# Patient Record
Sex: FEMALE | Race: WHITE
[De-identification: names, ages, dates, MRNs, and addresses within clinical notes are randomized per-mention and may not be internally consistent; named-entity substitution may affect disease eponyms.]

---

## 2017-03-26 ENCOUNTER — HOSPITAL ENCOUNTER (EMERGENCY)
Dept: HOSPITAL 62 - ER | Age: 47
Discharge: HOME | End: 2017-03-26
Payer: SELF-PAY

## 2017-03-26 VITALS — SYSTOLIC BLOOD PRESSURE: 176 MMHG | DIASTOLIC BLOOD PRESSURE: 92 MMHG

## 2017-03-26 DIAGNOSIS — R51: ICD-10-CM

## 2017-03-26 DIAGNOSIS — Z98.51: ICD-10-CM

## 2017-03-26 DIAGNOSIS — Z90.49: ICD-10-CM

## 2017-03-26 DIAGNOSIS — R30.0: ICD-10-CM

## 2017-03-26 DIAGNOSIS — R10.2: Primary | ICD-10-CM

## 2017-03-26 DIAGNOSIS — N83.9: ICD-10-CM

## 2017-03-26 DIAGNOSIS — R19.7: ICD-10-CM

## 2017-03-26 DIAGNOSIS — I10: ICD-10-CM

## 2017-03-26 DIAGNOSIS — J44.9: ICD-10-CM

## 2017-03-26 DIAGNOSIS — F17.200: ICD-10-CM

## 2017-03-26 DIAGNOSIS — Z87.442: ICD-10-CM

## 2017-03-26 DIAGNOSIS — R11.2: ICD-10-CM

## 2017-03-26 LAB
ALBUMIN SERPL-MCNC: 3.9 G/DL (ref 3.5–5)
ALP SERPL-CCNC: 75 U/L (ref 38–126)
ALT SERPL-CCNC: 22 U/L (ref 9–52)
ANION GAP SERPL CALC-SCNC: 11 MMOL/L (ref 5–19)
APPEARANCE UR: (no result)
AST SERPL-CCNC: 16 U/L (ref 14–36)
BASOPHILS # BLD AUTO: 0.1 10^3/UL (ref 0–0.2)
BASOPHILS NFR BLD AUTO: 0.8 % (ref 0–2)
BILIRUB DIRECT SERPL-MCNC: 0.2 MG/DL (ref 0–0.4)
BILIRUB SERPL-MCNC: 0.3 MG/DL (ref 0.2–1.3)
BILIRUB UR QL STRIP: NEGATIVE
BUN SERPL-MCNC: 11 MG/DL (ref 7–20)
CALCIUM: 8.9 MG/DL (ref 8.4–10.2)
CHLAM PCR: NOT DETECTED
CHLORIDE SERPL-SCNC: 108 MMOL/L (ref 98–107)
CO2 SERPL-SCNC: 24 MMOL/L (ref 22–30)
CREAT SERPL-MCNC: 0.59 MG/DL (ref 0.52–1.25)
EOSINOPHIL # BLD AUTO: 0.1 10^3/UL (ref 0–0.6)
EOSINOPHIL NFR BLD AUTO: 0.6 % (ref 0–6)
ERYTHROCYTE [DISTWIDTH] IN BLOOD BY AUTOMATED COUNT: 17.3 % (ref 11.5–14)
GLUCOSE SERPL-MCNC: 80 MG/DL (ref 75–110)
GLUCOSE UR STRIP-MCNC: NEGATIVE MG/DL
HCT VFR BLD CALC: 34.6 % (ref 36–47)
HGB BLD-MCNC: 11.5 G/DL (ref 12–15.5)
HGB HCT DIFFERENCE: -0.1
KETONES UR STRIP-MCNC: NEGATIVE MG/DL
LIPASE SERPL-CCNC: 132.6 U/L (ref 23–300)
LYMPHOCYTES # BLD AUTO: 2 10^3/UL (ref 0.5–4.7)
LYMPHOCYTES NFR BLD AUTO: 23.3 % (ref 13–45)
MCH RBC QN AUTO: 25.6 PG (ref 27–33.4)
MCHC RBC AUTO-ENTMCNC: 33.2 G/DL (ref 32–36)
MCV RBC AUTO: 77 FL (ref 80–97)
MONOCYTES # BLD AUTO: 0.7 10^3/UL (ref 0.1–1.4)
MONOCYTES NFR BLD AUTO: 8.5 % (ref 3–13)
NEUTROPHILS # BLD AUTO: 5.6 10^3/UL (ref 1.7–8.2)
NEUTS SEG NFR BLD AUTO: 66.8 % (ref 42–78)
NITRITE UR QL STRIP: NEGATIVE
PH UR STRIP: 7 [PH] (ref 5–9)
POTASSIUM SERPL-SCNC: 4.1 MMOL/L (ref 3.6–5)
PROT SERPL-MCNC: 6.6 G/DL (ref 6.3–8.2)
PROT UR STRIP-MCNC: NEGATIVE MG/DL
RBC # BLD AUTO: 4.49 10^6/UL (ref 3.72–5.28)
SODIUM SERPL-SCNC: 142.7 MMOL/L (ref 137–145)
SP GR UR STRIP: 1.01
UROBILINOGEN UR-MCNC: NEGATIVE MG/DL (ref ?–2)
WBC # BLD AUTO: 8.5 10^3/UL (ref 4–10.5)

## 2017-03-26 PROCEDURE — 93976 VASCULAR STUDY: CPT

## 2017-03-26 PROCEDURE — 87210 SMEAR WET MOUNT SALINE/INK: CPT

## 2017-03-26 PROCEDURE — 87591 N.GONORRHOEAE DNA AMP PROB: CPT

## 2017-03-26 PROCEDURE — 84703 CHORIONIC GONADOTROPIN ASSAY: CPT

## 2017-03-26 PROCEDURE — 36415 COLL VENOUS BLD VENIPUNCTURE: CPT

## 2017-03-26 PROCEDURE — 87491 CHLMYD TRACH DNA AMP PROBE: CPT

## 2017-03-26 PROCEDURE — 85025 COMPLETE CBC W/AUTO DIFF WBC: CPT

## 2017-03-26 PROCEDURE — 76830 TRANSVAGINAL US NON-OB: CPT

## 2017-03-26 PROCEDURE — 80053 COMPREHEN METABOLIC PANEL: CPT

## 2017-03-26 PROCEDURE — 96374 THER/PROPH/DIAG INJ IV PUSH: CPT

## 2017-03-26 PROCEDURE — 81001 URINALYSIS AUTO W/SCOPE: CPT

## 2017-03-26 PROCEDURE — 83690 ASSAY OF LIPASE: CPT

## 2017-03-26 PROCEDURE — 99284 EMERGENCY DEPT VISIT MOD MDM: CPT

## 2017-03-26 NOTE — ER DOCUMENT REPORT
ED Medical Screen (RME)





- General


Stated Complaint: ABDOMINAL PAIN


Mode of Arrival: Ambulatory


Information source: Patient


Notes: 


Patient presents to the emergency department via EMS for right side pain right 

upper quadrant pain reports pain with void for the last few days.  Also reports 

migraine nausea.  Reports recent treatment for UTI.  Reports history of kidney 

stones and gallbladder issues.














I have greeted and performed a rapid initial assessment of this patient.  A 

comprehensive ED assessment and evaluation of the patient, analysis of test 

results and completion of the medical decision making process will be conducted 

by additional ED providers.


TRAVEL OUTSIDE OF THE [place] IN LAST 30 DAYS: No





- Related Data


Allergies/Adverse Reactions: 


 





No Known Allergies Allergy (Verified 03/26/17 10:33)


 











Past Medical History





- Past Medical History


Cardiac Medical History: Reports: Hx Hypertension - Off lisinopril for months.  

Says she can't afford it despite it being $4


Pulmonary Medical History: Reports: Hx COPD


Renal/ Medical History: Reports: Hx Kidney Stones


Musculoskeltal Medical History: Reports Hx Musculoskeletal Trauma


Psychiatric Medical History: Reports: Hx Anxiety, Hx Depression


Traumatic Medical History: Reports: Hx Fractures - Wrist


Past Surgical History: Reports: Hx Appendectomy, Hx Tubal Ligation





- Immunizations


Hx Diphtheria, Pertussis, Tetanus Vaccination: No

## 2017-03-26 NOTE — ER DOCUMENT REPORT
ED GI/





- General


Chief Complaint: Abdominal Pain


Stated Complaint: ABDOMINAL PAIN


Mode of Arrival: Ambulatory


Information source: Patient


Notes: 


Patient presents complaining of right lower pelvic pain that radiates around to 

right flank area for the past 2-3 days.  Patient does report some burning with 

urination.  Patient does complain some nausea vomiting and diarrhea.  Patient 

states she's vomited once today and had diarrhea 3 episodes.  Patient denies 

any fever.  Patient does report mild headache pain.  Patient ports previous 

history kidney stones and is concerned about that today.


TRAVEL OUTSIDE OF THE [place] IN LAST 30 DAYS: No





- HPI


Patient complains to provider of: Abdominal pain, Dysuria, Flank pain


Onset: Other - 3 days


Timing/Duration: Gradual


Quality of pain: Achy


Pain Level: 3


Context: denies: Pregnant


Location: RLQ, Right flank


Vaginal bleeding (Compared to normal period): None


Sexual history: Active


Associated symptoms: Diarrhea, Dysuria, Nausea, Vomiting.  denies: Fever, Loss 

of appetite, Urinary hesitancy, Urinary frequency, Urinary retention, Urinary 

urgency, Vaginal discharge


Exacerbated by: Denies


Relieved by: Denies


Similar symptoms previously: Yes


Recently seen / treated by doctor: No





- Related Data


Allergies/Adverse Reactions: 


 





No Known Allergies Allergy (Verified 03/26/17 10:33)


 











Past Medical History





- General


Information source: Patient


Last Menstrual Period: 03/19/2017





- Social History


Smoking Status: Current Every Day Smoker


Chew tobacco use (# tins/day): No


Frequency of alcohol use: None


Drug Abuse: None


Occupation: none


Lives with: Spouse/Significant other


Family History: CAD, CVA, DM, Hyperlipidemia, Hypertension, Malignancy


Patient has suicidal ideation: No


Patient has homicidal ideation: No





- Past Medical History


Cardiac Medical History: Reports: Hx Hypertension - Off lisinopril for months.  

Says she can't afford it despite it being $4


Pulmonary Medical History: Reports: Hx COPD


Renal/ Medical History: Reports: Hx Kidney Stones.  Denies: Hx Peritoneal 

Dialysis


Musculoskeltal Medical History: Reports Hx Musculoskeletal Trauma


Psychiatric Medical History: Reports: Hx Anxiety, Hx Depression


Traumatic Medical History: Reports: Hx Fractures - Wrist


Past Surgical History: Reports: Hx Appendectomy, Hx Tubal Ligation





- Immunizations


Hx Diphtheria, Pertussis, Tetanus Vaccination: No





Review of Systems





- Review of Systems


Constitutional: No symptoms reported.  denies: Fever, Recent illness


EENT: No symptoms reported


Cardiovascular: No symptoms reported.  denies: Chest pain


Respiratory: No symptoms reported.  denies: Cough, Short of breath


Gastrointestinal: Abdominal pain - Right lower quadrant, Diarrhea, Nausea, 

Vomiting.  denies: Poor appetite


Genitourinary: Dysuria, Flank pain


Female Genitourinary: No symptoms reported.  denies: Pregnant, Vaginal discharge

, Vaginal bleeding


Musculoskeletal: Back pain - Right flank


Skin: No symptoms reported


Hematologic/Lymphatic: No symptoms reported


Neurological/Psychological: No symptoms reported





Physical Exam





- Vital signs


Vitals: 


 











Temp Pulse Resp BP Pulse Ox


 


 98.4 F   77   20   182/91 H  100 


 


 03/26/17 10:38  03/26/17 10:38  03/26/17 10:38  03/26/17 10:38  03/26/17 10:38














- General


General appearance: Appears well, Alert


In distress: None





- HEENT


Head: Normocephalic, Atraumatic


Eyes: Normal


Nasal: Normal


Mouth/Lips: Normal


Mucous membranes: Normal


Neck: Normal, Supple.  No: Lymphadenopathy





- Respiratory


Respiratory status: No respiratory distress


Chest status: Nontender


Breath sounds: Normal.  No: Rales, Rhonchi, Stridor, Wheezing


Chest palpation: Normal





- Cardiovascular


Rhythm: Regular


Heart sounds: S1 appreciated, S2 appreciated


Murmur: No





- Abdominal


Inspection: Normal


Distension: No distension


Bowel sounds: Normal


Tenderness: Tender - Right lateral side of abdomen.  No: McBurney's point, 

Lundy's sign


Organomegaly: No organomegaly





- Genitourinary


External exam: Normal


Speculum exam: Cervix closed


Vaginal bleeding: None


Bimanuel exam: Adnexal mass - Right side, Adnexal tenderness - Bilateral





- Back


Back: CVA tenderness - Right





- Extremities


General upper extremity: Normal inspection, Normal strength


General lower extremity: Normal inspection, Normal strength





- Neurological


Neuro grossly intact: Yes


Cognition: Normal


Egan Coma Scale Eye Opening: Spontaneous


Egan Coma Scale Verbal: Oriented


Egan Coma Scale Motor: Obeys Commands


Diego Coma Scale Total: 15





- Psychological


Associated symptoms: Normal affect, Normal mood





- Skin


Skin Temperature: Warm


Skin Moisture: Dry


Skin Color: Normal





Course





- Re-evaluation


Re-evalutation: 


03/26/17 13:12


Consulted with Dr. Hargrove regarding patient presentation, agrees with plan for 

ultrasound imaging





03/26/17 15:55


Consulted with GYN Dr. Coley who recommends outpatient follow-up in the 

office next week.  Recommends having patient call the office for follow-up.  

Also recommends to follow patient up for repeat ultrasound in about a month's 

time as an outpatient.  No additional testing recommended at this time.





- Vital Signs


Vital signs: 


 











Temp Pulse Resp BP Pulse Ox


 


 98.5 F   73   18   176/92 H  98 


 


 03/26/17 16:25  03/26/17 16:25  03/26/17 16:25  03/26/17 16:25  03/26/17 16:25














- Laboratory


Result Diagrams: 


 03/26/17 12:17





 03/26/17 12:17


Laboratory results interpreted by me: 


 











  03/26/17 03/26/17 03/26/17





  11:02 12:17 12:17


 


Hgb   11.5 L 


 


Hct   34.6 L 


 


MCV   77 L 


 


MCH   25.6 L 


 


RDW   17.3 H 


 


Chloride    108 H


 


Urine Blood  SMALL H  


 


Ur Leukocyte Esterase  SMALL H  








03/26/17 15:56





 Labs- Entire Visit











  03/26/17 03/26/17 03/26/17





  11:02 12:17 12:17


 


WBC   8.5 


 


RBC   4.49 


 


Hgb   11.5 L 


 


Hct   34.6 L 


 


MCV   77 L 


 


MCH   25.6 L 


 


MCHC   33.2 


 


RDW   17.3 H 


 


Plt Count   224 


 


Seg Neutrophils %   66.8 


 


Lymphocytes %   23.3 


 


Monocytes %   8.5 


 


Eosinophils %   0.6 


 


Basophils %   0.8 


 


Absolute Neutrophils   5.6 


 


Absolute Lymphocytes   2.0 


 


Absolute Monocytes   0.7 


 


Absolute Eosinophils   0.1 


 


Absolute Basophils   0.1 


 


Sodium    142.7


 


Potassium    4.1


 


Chloride    108 H


 


Carbon Dioxide    24


 


Anion Gap    11


 


BUN    11


 


Creatinine    0.59


 


Est GFR ( Amer)    > 60


 


Est GFR (Non-Af Amer)    > 60


 


Glucose    80


 


Calcium    8.9


 


Total Bilirubin    0.3


 


Direct Bilirubin    0.2


 


Indirect Bilirubin    Not Reportable


 


Neonat Total Bilirubin    Not Reportable


 


AST    16


 


ALT    22


 


Alkaline Phosphatase    75


 


Total Protein    6.6


 


Albumin    3.9


 


Lipase    132.6


 


Serum HCG, Qual   


 


Urine Color  YELLOW  


 


Urine Appearance  SLIGHTLY-CLOUDY  


 


Urine pH  7.0  


 


Ur Specific Gravity  1.008  


 


Urine Protein  NEGATIVE  


 


Urine Glucose (UA)  NEGATIVE  


 


Urine Ketones  NEGATIVE  


 


Urine Blood  SMALL H  


 


Urine Nitrite  NEGATIVE  


 


Urine Bilirubin  NEGATIVE  


 


Urine Urobilinogen  NEGATIVE  


 


Ur Leukocyte Esterase  SMALL H  


 


Urine WBC (Auto)  6  


 


Urine RBC (Auto)  2  


 


Squamous Epi Cells Auto  7  


 


Urine Ascorbic Acid  NEGATIVE  


 


Trichomonas (Wet Prep)   


 


Vaginal WBC   


 


Vaginal RBC   


 


Vaginal Yeast   


 


Chlamydia DNA (PCR)   


 


N.gonorrhoeae DNA (PCR)   














  03/26/17 03/26/17 03/26/17





  12:17 13:56 13:56


 


WBC   


 


RBC   


 


Hgb   


 


Hct   


 


MCV   


 


MCH   


 


MCHC   


 


RDW   


 


Plt Count   


 


Seg Neutrophils %   


 


Lymphocytes %   


 


Monocytes %   


 


Eosinophils %   


 


Basophils %   


 


Absolute Neutrophils   


 


Absolute Lymphocytes   


 


Absolute Monocytes   


 


Absolute Eosinophils   


 


Absolute Basophils   


 


Sodium   


 


Potassium   


 


Chloride   


 


Carbon Dioxide   


 


Anion Gap   


 


BUN   


 


Creatinine   


 


Est GFR ( Amer)   


 


Est GFR (Non-Af Amer)   


 


Glucose   


 


Calcium   


 


Total Bilirubin   


 


Direct Bilirubin   


 


Indirect Bilirubin   


 


Neonat Total Bilirubin   


 


AST   


 


ALT   


 


Alkaline Phosphatase   


 


Total Protein   


 


Albumin   


 


Lipase   


 


Serum HCG, Qual  NEGATIVE  


 


Urine Color   


 


Urine Appearance   


 


Urine pH   


 


Ur Specific Gravity   


 


Urine Protein   


 


Urine Glucose (UA)   


 


Urine Ketones   


 


Urine Blood   


 


Urine Nitrite   


 


Urine Bilirubin   


 


Urine Urobilinogen   


 


Ur Leukocyte Esterase   


 


Urine WBC (Auto)   


 


Urine RBC (Auto)   


 


Squamous Epi Cells Auto   


 


Urine Ascorbic Acid   


 


Trichomonas (Wet Prep)    NO TRICHOMONAS SEEN


 


Vaginal WBC    FEW WBCS SEEN


 


Vaginal RBC    RARE RBCS SEEN


 


Vaginal Yeast    NO YEAST SEEN


 


Chlamydia DNA (PCR)   NOT DETECTED 


 


N.gonorrhoeae DNA (PCR)   NOT DETECTED 














03/26/17 16:06








03/26/17 18:36








- Diagnostic Test


Radiology reviewed: Reports reviewed





Discharge





- Discharge


Clinical Impression: 


 Flank pain, Urinary symptom or sign, Ovarian mass, right, Vomiting and diarrhea





Hypertension


Qualifiers:


 Hypertension type: essential hypertension Qualified Code(s): I10 - Essential (

primary) hypertension





Condition: Stable


Disposition: HOME, SELF-CARE


Instructions:  Abdominal Pain ([facility]), Oral Narcotic Medication ([facility]), Antinausea 

Medication ([facility]), High Blood Pressure, Requiring Treatment ([facility]), Growth or Mass

, Pending Workup ([facility]), Nausea or Vomiting, Nonspecific ([facility]), Diarrhea, 

Nonspecific ([facility]), Urinary Tract Infection ([facility])


Additional Instructions: 


Return immediately for any new or worsening symptoms





Followup with your primary care provider, call tomorrow to make a followup 

appointment.  A primary doctor can refill your blood pressure medications for 

you.





Your ultrasound showed a mass within your right ovary.  Follow-up with Dr. Coley next week.  Call the office on Monday for an appointment.  Left the 

office staff know that you were seen in the emergency department and that she 

would like to see you next week for further evaluation.  She would also like 

you to have a repeat ultrasound in a month.





Follow up with a urologist for further evaluation








Prescriptions: 


Cephalexin Monohydrate [Keflex 500 mg Capsule] 500 mg PO BID 7 Days


Hydrocodone/Acetaminophen [Norco 5-325 Tablet] 1 each PO Q4 PRN #15 tablet


 PRN Reason: 


Lisinopril 10 mg PO DAILY #7 tablet


Forms:  Elevated Blood Pressure


Referrals: 


BARBI SORIA MD [ACTIVE STAFF] - Follow up in 3-5 days


[facility] [Provider Group] - Follow up tomorrow

## 2017-04-17 ENCOUNTER — HOSPITAL ENCOUNTER (EMERGENCY)
Dept: HOSPITAL 62 - ER | Age: 47
Discharge: HOME | End: 2017-04-17
Payer: SELF-PAY

## 2017-04-17 VITALS — SYSTOLIC BLOOD PRESSURE: 156 MMHG | DIASTOLIC BLOOD PRESSURE: 90 MMHG

## 2017-04-17 DIAGNOSIS — J44.9: ICD-10-CM

## 2017-04-17 DIAGNOSIS — F17.210: ICD-10-CM

## 2017-04-17 DIAGNOSIS — Z87.442: ICD-10-CM

## 2017-04-17 DIAGNOSIS — Z98.51: ICD-10-CM

## 2017-04-17 DIAGNOSIS — R93.8: ICD-10-CM

## 2017-04-17 DIAGNOSIS — R10.31: Primary | ICD-10-CM

## 2017-04-17 DIAGNOSIS — R03.0: ICD-10-CM

## 2017-04-17 LAB
ALBUMIN SERPL-MCNC: 4.3 G/DL (ref 3.5–5)
ALP SERPL-CCNC: 83 U/L (ref 38–126)
ALT SERPL-CCNC: 25 U/L (ref 9–52)
ANION GAP SERPL CALC-SCNC: 10 MMOL/L (ref 5–19)
APPEARANCE UR: (no result)
AST SERPL-CCNC: 18 U/L (ref 14–36)
BASOPHILS # BLD AUTO: 0.1 10^3/UL (ref 0–0.2)
BASOPHILS NFR BLD AUTO: 1 % (ref 0–2)
BILIRUB DIRECT SERPL-MCNC: 0.1 MG/DL (ref 0–0.4)
BILIRUB SERPL-MCNC: 0.2 MG/DL (ref 0.2–1.3)
BILIRUB UR QL STRIP: NEGATIVE
BUN SERPL-MCNC: 14 MG/DL (ref 7–20)
CALCIUM: 9.5 MG/DL (ref 8.4–10.2)
CHLORIDE SERPL-SCNC: 106 MMOL/L (ref 98–107)
CO2 SERPL-SCNC: 25 MMOL/L (ref 22–30)
CREAT SERPL-MCNC: 0.64 MG/DL (ref 0.52–1.25)
EOSINOPHIL # BLD AUTO: 0.2 10^3/UL (ref 0–0.6)
EOSINOPHIL NFR BLD AUTO: 2 % (ref 0–6)
ERYTHROCYTE [DISTWIDTH] IN BLOOD BY AUTOMATED COUNT: 18.4 % (ref 11.5–14)
GLUCOSE SERPL-MCNC: 91 MG/DL (ref 75–110)
GLUCOSE UR STRIP-MCNC: NEGATIVE MG/DL
HCT VFR BLD CALC: 36.3 % (ref 36–47)
HGB BLD-MCNC: 12 G/DL (ref 12–15.5)
HGB HCT DIFFERENCE: -0.3
KETONES UR STRIP-MCNC: NEGATIVE MG/DL
LYMPHOCYTES # BLD AUTO: 3.1 10^3/UL (ref 0.5–4.7)
LYMPHOCYTES NFR BLD AUTO: 33.8 % (ref 13–45)
MCH RBC QN AUTO: 26 PG (ref 27–33.4)
MCHC RBC AUTO-ENTMCNC: 33 G/DL (ref 32–36)
MCV RBC AUTO: 79 FL (ref 80–97)
MONOCYTES # BLD AUTO: 0.9 10^3/UL (ref 0.1–1.4)
MONOCYTES NFR BLD AUTO: 9.6 % (ref 3–13)
NEUTROPHILS # BLD AUTO: 4.9 10^3/UL (ref 1.7–8.2)
NEUTS SEG NFR BLD AUTO: 53.6 % (ref 42–78)
NITRITE UR QL STRIP: NEGATIVE
PH UR STRIP: 5 [PH] (ref 5–9)
POTASSIUM SERPL-SCNC: 4.3 MMOL/L (ref 3.6–5)
PROT SERPL-MCNC: 7 G/DL (ref 6.3–8.2)
PROT UR STRIP-MCNC: NEGATIVE MG/DL
RBC # BLD AUTO: 4.61 10^6/UL (ref 3.72–5.28)
SODIUM SERPL-SCNC: 141.2 MMOL/L (ref 137–145)
SP GR UR STRIP: 1.03
UROBILINOGEN UR-MCNC: NEGATIVE MG/DL (ref ?–2)
WBC # BLD AUTO: 9.1 10^3/UL (ref 4–10.5)

## 2017-04-17 PROCEDURE — 80053 COMPREHEN METABOLIC PANEL: CPT

## 2017-04-17 PROCEDURE — 85025 COMPLETE CBC W/AUTO DIFF WBC: CPT

## 2017-04-17 PROCEDURE — 36415 COLL VENOUS BLD VENIPUNCTURE: CPT

## 2017-04-17 PROCEDURE — 99284 EMERGENCY DEPT VISIT MOD MDM: CPT

## 2017-04-17 PROCEDURE — S0119 ONDANSETRON 4 MG: HCPCS

## 2017-04-17 PROCEDURE — 81001 URINALYSIS AUTO W/SCOPE: CPT

## 2017-04-17 PROCEDURE — 76830 TRANSVAGINAL US NON-OB: CPT

## 2017-04-17 PROCEDURE — 84703 CHORIONIC GONADOTROPIN ASSAY: CPT

## 2017-04-17 NOTE — ER DOCUMENT REPORT
ED General





- General


Chief Complaint: Abdominal Pain


Stated Complaint: ABDOMINAL PAIN


Mode of Arrival: Medic


Information source: Patient


Notes: 


Patient presents to the emergency department with complaints of right lower 

quadrant abdominal pain for close to a month. She reports the area feels 

swollen and pain radiates to her back.  Patient was evaluated and treated for 

same symptoms on March 26.  Patient was supposed to follow-up with OB/GYN for 

ovarian mass but reports her car broke down and she was unable to make the 

appointment.  Patient reports increasing pain.  She reports she did have some 

vomiting diarrhea couple days ago and none today.  Denies fever.  Reports she 

has a throbbing headache.  She also reports her blood pressures been very high.

  Patient also reports she's had frequent menses.  She reports she has her 

menses every couple weeks and it lasts for a week and is heavy.  She denies 

pain with void, denies vaginal discharge.


TRAVEL OUTSIDE OF THE [place] IN LAST 30 DAYS: No





- HPI


Onset: Other - over 23 days


Onset/Duration: Persistent


Quality of pain: Achy


Severity: Severe


Pain Level: 5


Associated symptoms: None


Exacerbated by: Denies


Relieved by: Denies


Similar symptoms previously: Yes


Recently seen / treated by doctor: Yes





- Related Data


Allergies/Adverse Reactions: 


 





No Known Allergies Allergy (Verified 03/26/17 10:33)


 











Past Medical History





- General


Information source: Patient


Last Menstrual Period: one week ago





- Social History


Smoking Status: Current Every Day Smoker


Cigarette use (# per day): Yes


Chew tobacco use (# tins/day): No


Frequency of alcohol use: None


Drug Abuse: None


Family History: CAD, CVA, DM, Hyperlipidemia, Hypertension, Malignancy


Patient has suicidal ideation: No


Patient has homicidal ideation: No





- Past Medical History


Cardiac Medical History: Reports: Hx Hypertension - Off lisinopril for months.  

Says she can't afford it despite it being $4


Pulmonary Medical History: Reports: Hx COPD


Renal/ Medical History: Reports: Hx Kidney Stones.  Denies: Hx Peritoneal 

Dialysis


Musculoskeltal Medical History: Reports Hx Musculoskeletal Trauma


Psychiatric Medical History: Reports: Hx Anxiety, Hx Depression


Traumatic Medical History: Reports: Hx Fractures - Wrist


Past Surgical History: Reports: Hx Appendectomy, Hx Tubal Ligation





- Immunizations


Hx Diphtheria, Pertussis, Tetanus Vaccination: No





Review of Systems





- Review of Systems


Notes: 


Review HPI for review of systems., All other systems negative





Physical Exam





- Vital signs


Vitals: 


 











Temp Pulse Resp BP Pulse Ox


 


 98.2 F   79   16   195/100 H  99 


 


 04/17/17 15:25  04/17/17 15:25  04/17/17 15:25  04/17/17 15:25  04/17/17 15:25














- Notes


Notes: 


PHYSICAL EXAMINATION: 


GENERAL: Well-appearing and in no acute distress nontoxic looking, patient is 

laying on the stretcher watching her iPad.


HEAD: Atraumatic, normocephalic. 


EYES: Pupils equal round and reactive to light, extraocular movements intact, 

sclera anicteric, conjunctiva are normal. 


ENT: nares patent,   Moist mucous membranes. 


NECK: Normal range of motion, supple without lymphadenopathy 


LUNGS: CTAB and equal. No wheezes rales or rhonchi. 


HEART: Regular rate and rhythm without murmurs 


ABDOMEN: Soft,RLQ tenderness. No guarding, no rebound 


BACK: Reports right side/flank pain


EXTREMITIES: Normal range of motion, no pitting edema. No cyanosis. 


NEUROLOGICAL: Cranial nerves grossly intact. Normal sensory/motor exams. 


PSYCH: Normal mood, normal affect. 


SKIN: Warm, Dry, normal turgor, no rashes or lesions noted








Course





- Re-evaluation


Re-evalutation: 


04/17/17 19:56


pt instructed on pending labs, repeat us.





04/17/17 21:55


Dr. Haile was contacted regarding patient's ultrasound showing endometrial 

thickening with a polypoid appearance.  She reports patient does need a biopsy.

  She reports patient is to call the office tomorrow and inform the office that 

she needs to be seen this week.  Patient was informed of the ultrasound and the 

need for biopsy.  Patient was informed of possible cancer.  Patient verbalized 

understanding to instructions to call.  Patient was also instructed on high 

blood pressure.  She was instructed on Percocet for the pain.  She verbalized 

understanding to all instructions.





- Vital Signs


Vital signs: 


 











Temp Pulse Resp BP Pulse Ox


 


 98.2 F   79   16   195/100 H  99 


 


 04/17/17 15:25  04/17/17 15:25  04/17/17 15:25  04/17/17 15:25  04/17/17 15:25














- Laboratory


Result Diagrams: 


 04/17/17 19:46





 04/17/17 19:46


Laboratory results interpreted by me: 


 











  04/17/17 04/17/17





  16:25 19:46


 


MCV   79 L


 


MCH   26.0 L


 


RDW   18.4 H


 


Ur Leukocyte Esterase  MODERATE H 














- Diagnostic Test


Radiology reviewed: Image reviewed, Reports reviewed - Diagnostic report text  

  EXAM DESCRIPTION: U/S NON-OB PELVIS TV W/O DOP   COMPLETED DATE/TIME: 4/17/ 2017 9:00 pm   REASON FOR STUDY: ovarian mass, re-eval   COMPARISON: 3/26/2017 

  TECHNIQUE: Dynamic and static grayscale images acquired of the pelvis via 

transvaginal approach  and recorded on PACS. Additional selected color Doppler 

and spectral images recorded.   LIMITATIONS: None.   FINDINGS: UTERUS: Contour 

normal. No mass.  ENDOMETRIAL STRIPE: There is central focal thickening the 

polypoid appearance measuring 13 x 6 by  10 mm with internal color flow, 

possible polyp.  CERVIX: No nabothian cysts.  RIGHT OVARY: Similar appearance 

of the 2 cm heterogeneous nodule with internal and peripheral  color flow.  

RIGHT OVARY DOPPLER: Normal arterial vascular flow without evidence for 

torsion.  LEFT OVARY: 17 mm luteal cyst.  LEFT OVARY DOPPLER: Normal arterial 

vascular flow without evidence for torsion.  FREE FLUID: Small amount of free 

fluid in the cul-de-sac.  OTHER: No other significant finding.  MEASUREMENTS:  

UTERUS: 8.1 x 5.0 x 4.0 cm  ENDOMETRIAL STRIPE: 13 mm  RIGHT OVARY: 2.6 x 2.5 x 

1.3 cm  LEFT OVARY: 2.7 x 1.6 x 1.6 cm   TECHNICAL DOCUMENTATION: JOB ID: 

[id number]   [employer]- All Rights Reserved     ORDER #: 

[id number] US/U/S NON-OB PELVIS TV W/O DOP  IMPRESSION: There is central focal 

endometrial thickening with a polypoid appearance measuring 13  x 6 by 10 mm 

with internal color flow, possible polyp.Similar appearance of the right 

ovarian 2 cm  heterogeneous nodule with internal and peripheral color flow. 

Outpatient Gyn follow-up is  recommended





Discharge





- Discharge


Clinical Impression: 


 Thickened endometrium, Elevated blood pressure reading





Abdominal pain


Qualifiers:


 Abdominal location: right lower quadrant Qualified Code(s): R10.31 - Right 

lower quadrant pain





Condition: Stable


Instructions:  Oral Narcotic Medication ([facility]), Abdominal Pain ([facility])


Additional Instructions: 


*You have been evaluated for abdominal pain, endometrial thickening


*Take medication as prescribed


*Follow up with OB/GYN tomorrow. Call [facility], inform them that Dr Haile 

has been contacted by the Emergency Department and 


has advised that you need an appointment this week


*Return to ED for worsening condition, changes, needs








Monitor your blood pressure. Your blood pressure was elevated today.  This may 

be because you were anxious, in pain or because you need medication.  It is 

important to follow up with your primary care provider for full evaluation.


 





Prescriptions: 


Oxycodone HCl/Acetaminophen [Percocet 5-325 mg Tablet] 1 - 2 tab PO ASDIR PRN #

20 tablet


 PRN Reason: 


Forms:  Elevated Blood Pressure


Referrals: 


SAL HAILE MD [ACTIVE STAFF] -  (call tomorrow)


[facility] [Provider Group] (call tomorrow for appointment this 

week)

## 2017-04-17 NOTE — ER DOCUMENT REPORT
ED Medical Screen (RME)





- General


Chief Complaint: Abdominal Pain


Stated Complaint: ABDOMINAL PAIN


Notes: 


This 47-year-old female patient comes in from complaining of right lower 

quadrant abdominal pain for 23 weeks.  She was seen here and found to have a 

right ovarian mass.  She reports she feels more swollen and the pain is getting 

worse.  There is been off and on vaginal bleeding.  She also has a migraine 

headache.  She also reports her urine has been darker and just dribbles.  Her 

blood pressure is elevated.  She does not have blood pressure medication.  She 

was referred at that time to follow up with [facility] and 

see Dr. Coley, she did not do this.  She is also to follow-up with a primary 

care provider for blood pressure medication she did not do that.  She reports 

the car is broken down, they have no friends or family.





I have greeted and performed a rapid initial assessment of this patient.  A 

comprehensive ED assessment and evaluation of the patient, analysis of test 

results and completion of the medical decision making process will be conducted 

by additional ED providers.


TRAVEL OUTSIDE OF THE [place] IN LAST 30 DAYS: No





- Related Data


Allergies/Adverse Reactions: 


 





No Known Allergies Allergy (Verified 03/26/17 10:33)


 











Past Medical History





- Past Medical History


Cardiac Medical History: Reports: Hx Hypertension - Off lisinopril for months.  

Says she can't afford it despite it being $4


Pulmonary Medical History: Reports: Hx COPD


Renal/ Medical History: Reports: Hx Kidney Stones.  Denies: Hx Peritoneal 

Dialysis


Musculoskeltal Medical History: Reports Hx Musculoskeletal Trauma


Psychiatric Medical History: Reports: Hx Anxiety, Hx Depression


Traumatic Medical History: Reports: Hx Fractures - Wrist


Past Surgical History: Reports: Hx Appendectomy, Hx Tubal Ligation





- Immunizations


Hx Diphtheria, Pertussis, Tetanus Vaccination: No

## 2017-07-23 ENCOUNTER — HOSPITAL ENCOUNTER (EMERGENCY)
Dept: HOSPITAL 62 - ER | Age: 47
Discharge: LEFT BEFORE DISCHARGE | End: 2017-07-23
Payer: SELF-PAY

## 2017-07-23 VITALS — SYSTOLIC BLOOD PRESSURE: 161 MMHG | DIASTOLIC BLOOD PRESSURE: 96 MMHG

## 2017-07-23 DIAGNOSIS — Z53.21: Primary | ICD-10-CM

## 2017-08-18 ENCOUNTER — HOSPITAL ENCOUNTER (EMERGENCY)
Dept: HOSPITAL 62 - ER | Age: 47
Discharge: HOME | End: 2017-08-18
Payer: COMMERCIAL

## 2017-08-18 VITALS — SYSTOLIC BLOOD PRESSURE: 170 MMHG | DIASTOLIC BLOOD PRESSURE: 101 MMHG

## 2017-08-18 DIAGNOSIS — R19.7: ICD-10-CM

## 2017-08-18 DIAGNOSIS — K42.9: ICD-10-CM

## 2017-08-18 DIAGNOSIS — N83.201: Primary | ICD-10-CM

## 2017-08-18 DIAGNOSIS — F17.200: ICD-10-CM

## 2017-08-18 DIAGNOSIS — R10.9: ICD-10-CM

## 2017-08-18 DIAGNOSIS — I10: ICD-10-CM

## 2017-08-18 DIAGNOSIS — N28.1: ICD-10-CM

## 2017-08-18 LAB
ALBUMIN SERPL-MCNC: 5.2 G/DL (ref 3.5–5)
ALP SERPL-CCNC: 86 U/L (ref 38–126)
ALT SERPL-CCNC: 26 U/L (ref 9–52)
ANION GAP SERPL CALC-SCNC: 15 MMOL/L (ref 5–19)
APPEARANCE UR: CLEAR
AST SERPL-CCNC: 23 U/L (ref 14–36)
BASOPHILS # BLD AUTO: 0.1 10^3/UL (ref 0–0.2)
BASOPHILS NFR BLD AUTO: 0.6 % (ref 0–2)
BILIRUB DIRECT SERPL-MCNC: 0.5 MG/DL (ref 0–0.4)
BILIRUB SERPL-MCNC: 0.7 MG/DL (ref 0.2–1.3)
BILIRUB UR QL STRIP: NEGATIVE
BUN SERPL-MCNC: 10 MG/DL (ref 7–20)
CALCIUM: 10 MG/DL (ref 8.4–10.2)
CHLORIDE SERPL-SCNC: 103 MMOL/L (ref 98–107)
CO2 SERPL-SCNC: 22 MMOL/L (ref 22–30)
CREAT SERPL-MCNC: 0.66 MG/DL (ref 0.52–1.25)
EOSINOPHIL # BLD AUTO: 0 10^3/UL (ref 0–0.6)
EOSINOPHIL NFR BLD AUTO: 0.2 % (ref 0–6)
ERYTHROCYTE [DISTWIDTH] IN BLOOD BY AUTOMATED COUNT: 16.9 % (ref 11.5–14)
GLUCOSE SERPL-MCNC: 86 MG/DL (ref 75–110)
GLUCOSE UR STRIP-MCNC: NEGATIVE MG/DL
HCT VFR BLD CALC: 45.4 % (ref 36–47)
HGB BLD-MCNC: 15.1 G/DL (ref 12–15.5)
HGB HCT DIFFERENCE: -0.1
KETONES UR STRIP-MCNC: (no result) MG/DL
LIPASE SERPL-CCNC: 156.6 U/L (ref 23–300)
LYMPHOCYTES # BLD AUTO: 1.5 10^3/UL (ref 0.5–4.7)
LYMPHOCYTES NFR BLD AUTO: 12.2 % (ref 13–45)
MCH RBC QN AUTO: 27.2 PG (ref 27–33.4)
MCHC RBC AUTO-ENTMCNC: 33.3 G/DL (ref 32–36)
MCV RBC AUTO: 82 FL (ref 80–97)
MONOCYTES # BLD AUTO: 0.4 10^3/UL (ref 0.1–1.4)
MONOCYTES NFR BLD AUTO: 3.5 % (ref 3–13)
NEUTROPHILS # BLD AUTO: 10.4 10^3/UL (ref 1.7–8.2)
NEUTS SEG NFR BLD AUTO: 83.5 % (ref 42–78)
NITRITE UR QL STRIP: NEGATIVE
PH UR STRIP: 6 [PH] (ref 5–9)
POTASSIUM SERPL-SCNC: 3.9 MMOL/L (ref 3.6–5)
PROT SERPL-MCNC: 8.8 G/DL (ref 6.3–8.2)
PROT UR STRIP-MCNC: NEGATIVE MG/DL
RBC # BLD AUTO: 5.56 10^6/UL (ref 3.72–5.28)
SODIUM SERPL-SCNC: 140 MMOL/L (ref 137–145)
SP GR UR STRIP: 1
UROBILINOGEN UR-MCNC: NEGATIVE MG/DL (ref ?–2)
WBC # BLD AUTO: 12.5 10^3/UL (ref 4–10.5)

## 2017-08-18 PROCEDURE — 76380 CAT SCAN FOLLOW-UP STUDY: CPT

## 2017-08-18 PROCEDURE — 84703 CHORIONIC GONADOTROPIN ASSAY: CPT

## 2017-08-18 PROCEDURE — 36415 COLL VENOUS BLD VENIPUNCTURE: CPT

## 2017-08-18 PROCEDURE — 99284 EMERGENCY DEPT VISIT MOD MDM: CPT

## 2017-08-18 PROCEDURE — 85025 COMPLETE CBC W/AUTO DIFF WBC: CPT

## 2017-08-18 PROCEDURE — 81001 URINALYSIS AUTO W/SCOPE: CPT

## 2017-08-18 PROCEDURE — 83690 ASSAY OF LIPASE: CPT

## 2017-08-18 PROCEDURE — 80053 COMPREHEN METABOLIC PANEL: CPT

## 2017-08-18 NOTE — RADIOLOGY REPORT (SQ)
EXAM DESCRIPTION:  CT LTD RENAL STONE PROTOCOL ON



COMPLETED DATE/TIME:  8/18/2017 10:58 am



REASON FOR STUDY:  r flank, r side abd pain



COMPARISON:  Abdominal ultrasound 12/21/2015, 8/1/2016.

CT abdomen pelvis 8/4/2016



TECHNIQUE:  CT scan of the abdomen and pelvis performed without intravenous or oral contrast. Images 
reviewed with lung, soft tissue, and bone windows. Reconstructed coronal and sagittal MPR images revi
ewed. All images stored on PACS.

All CT scanners at this facility use dose modulation, iterative reconstruction, and/or weight based d
osing when appropriate to reduce radiation dose to as low as reasonably achievable (ALARA).

[facility]: Dose Right  [facility]: CareDose    [facility]: Dose Right    [facility]: Teradose 4D    [facility]: Smart Technologies



RADIATION DOSE:  Up-to-date CT equipment and radiation dose reduction techniques were employed. CTDIv
ol: 4.8 mGy. DLP: 237 mGy-cm.mGy.



LIMITATIONS:  No oral or IV contrast



FINDINGS:  LOWER CHEST: No significant findings. No nodules or infiltrates.

NON-CONTRASTED LIVER, SPLEEN, ADRENALS: Evaluation limited by lack of IV contrast. No identified sign
ificant masses.

PANCREAS: No masses. No peripancreatic inflammatory changes.

GALLBLADDER: No identified stones by CT criteria. No inflammatory changes to suggest cholecystitis.

RIGHT KIDNEY AND URETER: No suspicious masses. Assessment limited by lack of IV contrast.  Stable 2.5
 cm right midpole renal cortical cyst.  No significant calcifications.   No hydronephrosis or hydrour
eter.

LEFT KIDNEY AND URETER: No suspicious masses. Assessment limited by lack of IV contrast.   No signifi
cant calcifications.   No hydronephrosis or hydroureter.

AORTA AND RETROPERITONEUM: No aneurysm. No retroperitoneal masses or adenopathy.

BOWEL AND PERITONEAL CAVITY: No obvious masses or inflammatory changes. No free fluid.

APPENDIX: Normal.

PELVIS, BLADDER, AND ABDOMINAL WALL:No abnormal masses. No free fluid. Bladder normal.  Small fatty u
mbilical hernia with the abdominal wall defect less than 1 cm diameter on axial image 33, sagittal im
age 48, and coronal image 7

BONES: No significant findings.

OTHER: No other significant finding.



IMPRESSION:  No CT findings to explain history of left flank pain.



TECHNICAL DOCUMENTATION:  JOB ID:  [id number]

Quality ID # [id number]: Final reports with documentation of one or more dose reduction techniques (e.g., Au
tomated exposure control, adjustment of the mA and/or kV according to patient size, use of iterative 
reconstruction technique)

 2011 [employer]- All Rights Reserved

## 2017-08-18 NOTE — ER DOCUMENT REPORT
ED GI/





- General


Chief Complaint: Abdominal Pain


Stated Complaint: ABDOMINAL PAIN


Mode of Arrival: Ambulatory


Information source: Patient


Notes: 





Patient presents complaining of right-sided flank pain that radiates to right 

side of abdomen.  Patient does report diarrhea that started yesterday and she 

has had 3 episodes today.  Patient does complain of some urinary frequency with 

odor.  Patient denies any vaginal bleeding or discharge.  Patient denies any 

fever.  Patient states she has a known history of a mass on her right ovary but 

she just got her insurance and plans to follow-up with Dr. Haile for further 

evaluation.


TRAVEL OUTSIDE OF THE [place] IN LAST 30 DAYS: No





- HPI


Patient complains to provider of: Abdominal pain, Flank pain.  No: Vaginal 

discharge, Vaginal pain, Vomiting


Onset: Yesterday


Timing/Duration: Gradual


Quality of pain: Sharp


Pain Level: 4


Location: Right flank, Other - Right side of abdomen


Vaginal bleeding (Compared to normal period): None


Associated symptoms: Diarrhea, Urinary frequency.  denies: Fever, Nausea, 

Urinary hesitancy, Vomiting


Exacerbated by: Denies


Relieved by: Denies


Similar symptoms previously: No


Recently seen / treated by doctor: No





- Related Data


Allergies/Adverse Reactions: 


 





No Known Allergies Allergy (Verified 08/18/17 08:18)


 











Past Medical History





- General


Information source: Patient





- Social History


Smoking Status: Current Some Day Smoker


Frequency of alcohol use: None


Drug Abuse: None


Lives with: Spouse/Significant other


Family History: CAD, CVA, DM, Hyperlipidemia, Hypertension, Malignancy





- Past Medical History


Cardiac Medical History: Reports: Hx Hypertension - Off lisinopril for months.  

Says she can't afford it despite it being $4


Pulmonary Medical History: Reports: Hx COPD


Renal/ Medical History: Reports: Hx Kidney Stones.  Denies: Hx Peritoneal 

Dialysis


Malignancy Medical History: Reports: Other


Other: 





right ovarian mass


Musculoskeltal Medical History: Reports Hx Musculoskeletal Trauma


Psychiatric Medical History: Reports: Hx Anxiety, Hx Depression


Traumatic Medical History: Reports: Hx Fractures - Wrist


Past Surgical History: Reports: Hx Appendectomy, Hx Tubal Ligation





- Immunizations


Hx Diphtheria, Pertussis, Tetanus Vaccination: No





Review of Systems





- Review of Systems


Constitutional: No symptoms reported.  denies: Fever, Recent illness


EENT: No symptoms reported


Cardiovascular: No symptoms reported.  denies: Chest pain


Respiratory: No symptoms reported.  denies: Cough, Short of breath


Gastrointestinal: Abdominal pain, Diarrhea.  denies: Nausea, Vomiting


Genitourinary: Frequency, Flank pain.  denies: Dysuria


Female Genitourinary: No symptoms reported.  denies: Vaginal discharge, Vaginal 

bleeding


Musculoskeletal: Back pain - right lower back


Skin: No symptoms reported


Hematologic/Lymphatic: No symptoms reported


Neurological/Psychological: No symptoms reported





Physical Exam





- Vital signs


Vitals: 


 











Temp Pulse Resp BP Pulse Ox


 


 98.2 F   81   16   154/94 H  98 


 


 08/18/17 08:19  08/18/17 08:19  08/18/17 08:19  08/18/17 08:19  08/18/17 08:19














- General


General appearance: Appears well, Alert


In distress: None





- HEENT


Head: Normocephalic, Atraumatic


Eyes: Normal


Nasal: Normal


Mouth/Lips: Normal


Pharynx: Normal


Neck: Normal, Supple.  No: Lymphadenopathy





- Respiratory


Respiratory status: No respiratory distress


Chest status: Nontender


Breath sounds: Normal.  No: Rales, Rhonchi, Stridor, Wheezing


Chest palpation: Normal





- Cardiovascular


Rhythm: Regular


Heart sounds: S1 appreciated, S2 appreciated


Murmur: No





- Abdominal


Inspection: Normal


Distension: No distension


Bowel sounds: Normal


Tenderness: Tender - r side abd tenderness


Organomegaly: No organomegaly





- Back


Back: CVA tenderness - right





- Extremities


General upper extremity: Normal inspection, Normal ROM


General lower extremity: Normal inspection, Normal ROM





- Neurological


Neuro grossly intact: Yes


Cognition: Normal


[place] Coma Scale Eye Opening: Spontaneous


Diego Coma Scale Verbal: Oriented


Diego Coma Scale Motor: Obeys Commands


[place] Coma Scale Total: 15





- Psychological


Associated symptoms: Normal affect, Normal mood





- Skin


Skin Temperature: Warm


Skin Moisture: Dry


Skin Color: Normal





Course





- Re-evaluation


Re-evalutation: 





08/18/17 11:19


Patient's abdomen soft, nontender at this time.  Patient reports having 

diarrhea 3 episodes today.  Discussed findings on CT report.  Patient 

encouraged to follow-up with a gynecologist for further evaluation of her 

ovarian mass as well as to follow-up with a urologist for further evaluation of 

renal stone.  Patient presents with abdominal pain without signs of peritonitis 

or other life-threatening or serious etiology.  Patient appears stable for 

discharge and has been instructed to return immediately if the symptoms worsen 

in any way, or in 8-12 hours if not improved for reevaluation.  The patient has 

been instructed to return if the symptoms worsen or change in any way.


08/18/17 11:23


Patient denies any concerns about sexually transmitted infection.  Patient 

denies any vaginal bleeding or discharge.  Patient defers pelvic examination at 

this time





- Vital Signs


Vital signs: 


 











Temp Pulse Resp BP Pulse Ox


 


 98.5 F   66   18   170/101 H  99 


 


 08/18/17 11:51  08/18/17 11:51  08/18/17 11:51  08/18/17 11:51  08/18/17 11:51














- Laboratory


Result Diagrams: 


 08/18/17 09:46





 08/18/17 09:46


Laboratory results interpreted by me: 


 











  08/18/17 08/18/17 08/18/17





  09:46 09:46 09:46


 


WBC  12.5 H  


 


RBC  5.56 H  


 


RDW  16.9 H  


 


Seg Neutrophils %  83.5 H  


 


Lymphocytes %  12.2 L  


 


Absolute Neutrophils  10.4 H  


 


Direct Bilirubin   0.5 H 


 


Total Protein   8.8 H 


 


Albumin   5.2 H 


 


Urine Ketones    TRACE H











08/18/17 11:19





 Labs- Entire Visit











  08/18/17 08/18/17 08/18/17





  09:46 09:46 09:46


 


WBC  12.5 H  


 


RBC  5.56 H  


 


Hgb  15.1  


 


Hct  45.4  


 


MCV  82  


 


MCH  27.2  


 


MCHC  33.3  


 


RDW  16.9 H  


 


Plt Count  247  


 


Seg Neutrophils %  83.5 H  


 


Lymphocytes %  12.2 L  


 


Monocytes %  3.5  


 


Eosinophils %  0.2  


 


Basophils %  0.6  


 


Absolute Neutrophils  10.4 H  


 


Absolute Lymphocytes  1.5  


 


Absolute Monocytes  0.4  


 


Absolute Eosinophils  0.0  


 


Absolute Basophils  0.1  


 


Sodium   140.0 


 


Potassium   3.9 


 


Chloride   103 


 


Carbon Dioxide   22 


 


Anion Gap   15 


 


BUN   10 


 


Creatinine   0.66 


 


Est GFR ( Amer)   > 60 


 


Est GFR (Non-Af Amer)   > 60 


 


Glucose   86 


 


Calcium   10.0 


 


Total Bilirubin   0.7 


 


Direct Bilirubin   0.5 H 


 


Indirect Bilirubin   Not Reportable 


 


Neonat Total Bilirubin   Not Reportable 


 


AST   23 


 


ALT   26 


 


Alkaline Phosphatase   86 


 


Total Protein   8.8 H 


 


Albumin   5.2 H 


 


Lipase   156.6 


 


Serum HCG, Qual    NEGATIVE


 


Urine Color   


 


Urine Appearance   


 


Urine pH   


 


Ur Specific Gravity   


 


Urine Protein   


 


Urine Glucose (UA)   


 


Urine Ketones   


 


Urine Blood   


 


Urine Nitrite   


 


Urine Bilirubin   


 


Urine Urobilinogen   


 


Ur Leukocyte Esterase   


 


Urine WBC (Auto)   


 


Squamous Epi Cells Auto   


 


Urine Mucus (Auto)   


 


Urine Ascorbic Acid   














  08/18/17





  09:46


 


WBC 


 


RBC 


 


Hgb 


 


Hct 


 


MCV 


 


MCH 


 


MCHC 


 


RDW 


 


Plt Count 


 


Seg Neutrophils % 


 


Lymphocytes % 


 


Monocytes % 


 


Eosinophils % 


 


Basophils % 


 


Absolute Neutrophils 


 


Absolute Lymphocytes 


 


Absolute Monocytes 


 


Absolute Eosinophils 


 


Absolute Basophils 


 


Sodium 


 


Potassium 


 


Chloride 


 


Carbon Dioxide 


 


Anion Gap 


 


BUN 


 


Creatinine 


 


Est GFR ( Amer) 


 


Est GFR (Non-Af Amer) 


 


Glucose 


 


Calcium 


 


Total Bilirubin 


 


Direct Bilirubin 


 


Indirect Bilirubin 


 


Neonat Total Bilirubin 


 


AST 


 


ALT 


 


Alkaline Phosphatase 


 


Total Protein 


 


Albumin 


 


Lipase 


 


Serum HCG, Qual 


 


Urine Color  STRAW


 


Urine Appearance  CLEAR


 


Urine pH  6.0


 


Ur Specific Gravity  1.003


 


Urine Protein  NEGATIVE


 


Urine Glucose (UA)  NEGATIVE


 


Urine Ketones  TRACE H


 


Urine Blood  NEGATIVE


 


Urine Nitrite  NEGATIVE


 


Urine Bilirubin  NEGATIVE


 


Urine Urobilinogen  NEGATIVE


 


Ur Leukocyte Esterase  NEGATIVE


 


Urine WBC (Auto)  1


 


Squamous Epi Cells Auto  <1


 


Urine Mucus (Auto)  RARE


 


Urine Ascorbic Acid  NEGATIVE














- Diagnostic Test


Radiology reviewed: Reports reviewed





Discharge





- Discharge


Clinical Impression: 


 Hx of essential hypertension, Flank pain, Renal cyst, hx right ovarian mass





Diarrhea


Qualifiers:


 Diarrhea type: unspecified type Qualified Code(s): R19.7 - Diarrhea, 

unspecified





Umbilical hernia


Qualifiers:


 Obstruction and gangrene presence: without obstruction or gangrene Qualified 

Code(s): K42.9 - Umbilical hernia without obstruction or gangrene





Condition: Stable


Disposition: HOME, SELF-CARE


Instructions:  Abdominal Pain ([facility]), Diarrhea, Nonspecific ([facility]), Flank Pain (

[facility]), Umbilical Hernia ([facility])


Additional Instructions: 


Return immediately for any new or worsening symptoms





Followup with your primary care provider, call tomorrow to make a followup 

appointment





Follow-up with a urologist for further evaluation of her renal cyst 





follow-up with a gynecologist for further evaluation of ovarian mass














Prescriptions: 


Lisinopril 10 mg PO DAILY #15 tablet


Oxycodone HCl/Acetaminophen [Percocet 5-325 mg Tablet] 1 tab PO ASDIR PRN #10 

tablet


 PRN Reason: 


Forms:  Elevated Blood Pressure


Referrals: 


SAL HAILE MD [ACTIVE STAFF] - Follow up as needed


[facility] [Provider Group] - Follow up as needed


[facility] [Provider Group] - Follow up as needed


[facility] [Provider Group] - Follow up as needed

## 2017-09-26 ENCOUNTER — HOSPITAL ENCOUNTER (OUTPATIENT)
Dept: HOSPITAL 62 - RAD | Age: 47
End: 2017-09-26
Attending: FAMILY MEDICINE
Payer: COMMERCIAL

## 2017-09-26 DIAGNOSIS — R10.9: Primary | ICD-10-CM

## 2017-09-26 DIAGNOSIS — M54.5: ICD-10-CM

## 2017-09-26 PROCEDURE — 76705 ECHO EXAM OF ABDOMEN: CPT

## 2017-09-26 NOTE — RADIOLOGY REPORT (SQ)
EXAM DESCRIPTION:  U/S ABDOMEN LIMITED W/O DOP



COMPLETED DATE/TIME:  9/26/2017 9:55 am



REASON FOR STUDY:  ABDOMINL PAIN R10.9  UNSPECIFIED ABDOMINAL PAIN



COMPARISON:  None.



TECHNIQUE:  Dynamic and static grayscale images acquired of the abdomen and recorded on PACS. Additio
nal selected color Doppler and spectral images recorded.



LIMITATIONS:  None.



FINDINGS:  PANCREAS: No masses.  Visualized pancreatic duct normal caliber.

LIVER: No masses. Echotexture normal.

LIVER VASCULATURE: Normal directional flow of the main portal vein and hepatic veins.

GALLBLADDER: No stones.  3 mm mucosal polyp.  Normal wall thickness. No pericholecystic fluid.

ULTRASOUND-DETECTED HAM'S SIGN: Negative.

INTRAHEPATIC DUCTS AND COMMON DUCT: CBD and intrahepatic ducts normal caliber. No filling defects.

INFERIOR VENA CAVA: Normal flow.

AORTA: No aneurysm.

RIGHT KIDNEY:  Normal size. Normal echogenicity.  2.2 cm cyst.  No solid or suspicious masses. No hyd
ronephrosis. No calcifications.

PERITONEAL AND RIGHT PLEURAL SPACE: No ascites or effusions.

OTHER: No other significant findings.



IMPRESSION:  SMALL MUCOSAL POLYP IN THE GALLBLADDER.  CORTICAL CYST IN THE RIGHT KIDNEY.  NO SIGNIFIC
ANT CHANGE.  NO ACUTE FINDINGS.



TECHNICAL DOCUMENTATION:  JOB ID:  [id number]

 2011 [employer]- All Rights Reserved

## 2017-10-03 ENCOUNTER — HOSPITAL ENCOUNTER (EMERGENCY)
Dept: HOSPITAL 62 - ER | Age: 47
Discharge: HOME | End: 2017-10-03
Payer: COMMERCIAL

## 2017-10-03 VITALS — SYSTOLIC BLOOD PRESSURE: 162 MMHG | DIASTOLIC BLOOD PRESSURE: 90 MMHG

## 2017-10-03 DIAGNOSIS — R10.2: ICD-10-CM

## 2017-10-03 DIAGNOSIS — R30.0: ICD-10-CM

## 2017-10-03 DIAGNOSIS — D25.9: Primary | ICD-10-CM

## 2017-10-03 DIAGNOSIS — Z90.49: ICD-10-CM

## 2017-10-03 DIAGNOSIS — Z98.51: ICD-10-CM

## 2017-10-03 DIAGNOSIS — I10: ICD-10-CM

## 2017-10-03 DIAGNOSIS — J44.9: ICD-10-CM

## 2017-10-03 LAB
ALBUMIN SERPL-MCNC: 4.6 G/DL (ref 3.5–5)
ALP SERPL-CCNC: 89 U/L (ref 38–126)
ALT SERPL-CCNC: 22 U/L (ref 9–52)
ANION GAP SERPL CALC-SCNC: 10 MMOL/L (ref 5–19)
APPEARANCE UR: CLEAR
AST SERPL-CCNC: 16 U/L (ref 14–36)
BASOPHILS # BLD AUTO: 0.1 10^3/UL (ref 0–0.2)
BASOPHILS NFR BLD AUTO: 0.7 % (ref 0–2)
BILIRUB DIRECT SERPL-MCNC: 0.2 MG/DL (ref 0–0.4)
BILIRUB SERPL-MCNC: 0.2 MG/DL (ref 0.2–1.3)
BILIRUB UR QL STRIP: NEGATIVE
BUN SERPL-MCNC: 16 MG/DL (ref 7–20)
CALCIUM: 10 MG/DL (ref 8.4–10.2)
CHLORIDE SERPL-SCNC: 105 MMOL/L (ref 98–107)
CO2 SERPL-SCNC: 24 MMOL/L (ref 22–30)
CREAT SERPL-MCNC: 0.76 MG/DL (ref 0.52–1.25)
EOSINOPHIL # BLD AUTO: 0.2 10^3/UL (ref 0–0.6)
EOSINOPHIL NFR BLD AUTO: 1.5 % (ref 0–6)
ERYTHROCYTE [DISTWIDTH] IN BLOOD BY AUTOMATED COUNT: 17.1 % (ref 11.5–14)
GLUCOSE SERPL-MCNC: 82 MG/DL (ref 75–110)
GLUCOSE UR STRIP-MCNC: NEGATIVE MG/DL
HCT VFR BLD CALC: 39.7 % (ref 36–47)
HGB BLD-MCNC: 13.5 G/DL (ref 12–15.5)
HGB HCT DIFFERENCE: 0.8
KETONES UR STRIP-MCNC: NEGATIVE MG/DL
LIPASE SERPL-CCNC: 206.7 U/L (ref 23–300)
LYMPHOCYTES # BLD AUTO: 2.6 10^3/UL (ref 0.5–4.7)
LYMPHOCYTES NFR BLD AUTO: 25.8 % (ref 13–45)
MCH RBC QN AUTO: 27.9 PG (ref 27–33.4)
MCHC RBC AUTO-ENTMCNC: 34 G/DL (ref 32–36)
MCV RBC AUTO: 82 FL (ref 80–97)
MONOCYTES # BLD AUTO: 0.7 10^3/UL (ref 0.1–1.4)
MONOCYTES NFR BLD AUTO: 7 % (ref 3–13)
NEUTROPHILS # BLD AUTO: 6.5 10^3/UL (ref 1.7–8.2)
NEUTS SEG NFR BLD AUTO: 65 % (ref 42–78)
NITRITE UR QL STRIP: NEGATIVE
PH UR STRIP: 5 [PH] (ref 5–9)
POTASSIUM SERPL-SCNC: 4.3 MMOL/L (ref 3.6–5)
PROT SERPL-MCNC: 7.6 G/DL (ref 6.3–8.2)
PROT UR STRIP-MCNC: NEGATIVE MG/DL
RBC # BLD AUTO: 4.84 10^6/UL (ref 3.72–5.28)
SODIUM SERPL-SCNC: 138.6 MMOL/L (ref 137–145)
SP GR UR STRIP: 1.01
UROBILINOGEN UR-MCNC: NEGATIVE MG/DL (ref ?–2)
WBC # BLD AUTO: 9.9 10^3/UL (ref 4–10.5)

## 2017-10-03 PROCEDURE — 81025 URINE PREGNANCY TEST: CPT

## 2017-10-03 PROCEDURE — 80053 COMPREHEN METABOLIC PANEL: CPT

## 2017-10-03 PROCEDURE — 93976 VASCULAR STUDY: CPT

## 2017-10-03 PROCEDURE — 76830 TRANSVAGINAL US NON-OB: CPT

## 2017-10-03 PROCEDURE — 81001 URINALYSIS AUTO W/SCOPE: CPT

## 2017-10-03 PROCEDURE — 99284 EMERGENCY DEPT VISIT MOD MDM: CPT

## 2017-10-03 PROCEDURE — 83690 ASSAY OF LIPASE: CPT

## 2017-10-03 PROCEDURE — 36415 COLL VENOUS BLD VENIPUNCTURE: CPT

## 2017-10-03 PROCEDURE — 85025 COMPLETE CBC W/AUTO DIFF WBC: CPT

## 2017-10-03 NOTE — ER DOCUMENT REPORT
ED General





- General


Chief Complaint: Pelvic Pain


Stated Complaint: ABDOMINAL PAIN


Time Seen by Provider: 10/03/17 15:34


Mode of Arrival: Ambulatory


Information source: Patient


Notes: 





47-year-old female presents with multiple complaints stating that she is having 

some burning with urination, pelvic pain. pt notes symptoms have been on going 

for a long period of time. pt denies any fevers or chills. pt denies any abd 

pain. 


TRAVEL OUTSIDE OF THE [place] IN LAST 30 DAYS: No





- HPI


Onset: Other


Onset/Duration: Intermittent


Quality of pain: Cramping


Severity: Mild


Pain Level: 1


Associated symptoms: None


Exacerbated by: Coughing - Coughing makes her urinate, Other - Sanders when she 

urinates


Relieved by: Denies


Similar symptoms previously: Yes


Recently seen / treated by doctor: Yes





- Related Data


Allergies/Adverse Reactions: 


 





No Known Allergies Allergy (Verified 10/03/17 15:09)


 











Past Medical History





- Social History


Smoking Status: Current Every Day Smoker


Cigarette use (# per day): Yes


Chew tobacco use (# tins/day): No


Smoking Education Provided: No


Frequency of alcohol use: None


Drug Abuse: None


Family History: CAD, CVA, DM, Hyperlipidemia, Hypertension, Malignancy


Patient has suicidal ideation: No


Patient has homicidal ideation: No





- Past Medical History


Cardiac Medical History: Reports: Hx Hypertension - Off lisinopril for months.  

Says she can't afford it despite it being $4


Pulmonary Medical History: Reports: Hx COPD


Renal/ Medical History: Reports: Hx Kidney Stones.  Denies: Hx Peritoneal 

Dialysis


Musculoskeltal Medical History: Reports Hx Musculoskeletal Trauma


Psychiatric Medical History: Reports: Hx Anxiety, Hx Depression


Traumatic Medical History: Reports: Hx Fractures - Wrist


Past Surgical History: Reports: Hx Appendectomy, Hx Tubal Ligation





- Immunizations


Hx Diphtheria, Pertussis, Tetanus Vaccination: No





Review of Systems





- Review of Systems


Notes: 





REVIEW OF SYSTEMS:


CONSTITUTIONAL :  Denies fever,  chills, or sweats.  Denies recent illness.


EENT:   Denies eye, ear, throat, or mouth pain or symptoms.  Denies nasal or 

sinus congestion or discharge.  Denies throat, tongue, or mouth swelling or 

difficulty swallowing.


CARDIOVASCULAR:  Denies chest pain.  Denies palpitations or racing or irregular 

heart beat.  Denies ankle edema.


RESPIRATORY:  Denies cough, cold, or chest congestion.  Denies shortness of 

breath, difficulty breathing, or wheezing.


GASTROINTESTINAL:  Denies abdominal pain or distention.  Denies nausea, vomiting

, or diarrhea.  Denies blood in vomitus, stools, or per rectum.  Denies black, 

tarry stools.  Denies constipation. 


GENITOURINARY: Admits to burning in urination


FEMALE  GENITOURINARY:  Denies vaginal bleeding, heavy or abnormal periods, 

irregular periods.  Denies vaginal discharge or odor. 


MUSCULOSKELETAL:  Denies back or neck pain or stiffness.  Denies joint pain or 

swelling.


SKIN:   Denies rash, lesions or sores.


HEMATOLOGIC :   Denies easy bruising or bleeding.


LYMPHATIC:  Denies swollen, enlarged glands.


NEUROLOGICAL:  Denies confusion or altered mental status.  Denies passing out 

or loss of consciousness.  Denies dizziness or lightheadedness.  Denies 

headache.  Denies weakness or paralysis or loss of use of either side.  Denies 

problems with gait or speech.  Denies sensory loss, numbness, or tingling.  

Denies seizures.


PSYCHIATRIC:  Denies anxiety or stress.  Denies depression, suicidal ideation, 

or homicidal ideation.





ALL OTHER SYSTEMS REVIEWED AND NEGATIVE.











PHYSICAL EXAMINATION:





GENERAL: Well-appearing, well-nourished and in no acute distress.





HEAD: Atraumatic, normocephalic.





EYES: Pupils equal round and reactive to light, extraocular movements intact, 

conjunctiva are normal.





ENT: Nares patent, oropharynx clear without exudates.  Moist mucous membranes.





NECK: Normal range of motion, supple without lymphadenopathy





LUNGS: Breath sounds clear to auscultation bilaterally and equal.  No wheezes 

rales or rhonchi.





HEART: Regular rate and rhythm without murmurs





ABDOMEN: Soft, nontender, nondistended abdomen.  No guarding, no rebound.  No 

masses appreciated.





Female : deferred by patient





Musculoskeletal: Normal range of motion, no pitting or edema.  No cyanosis.





NEUROLOGICAL: Cranial nerves grossly intact.  Normal speech, normal gait.  

Normal sensory, motor exams





PSYCH: Normal mood, normal affect.





SKIN: Warm, Dry, normal turgor, no rashes or lesions noted.

















Dictation was performed using Dragon voice recognition software





Physical Exam





- Vital signs


Vitals: 


 











Temp Pulse Resp BP Pulse Ox


 


 98.7 F   74   18   133/86 H  99 


 


 10/03/17 15:13  10/03/17 15:13  10/03/17 15:13  10/03/17 15:13  10/03/17 15:13














Course





- Re-evaluation


Re-evalutation: 





10/03/17 18:34


Lab work noted no significant abnormality


10/03/17 18:44


I contacted Dr Hester regarding findings and ultrasound  report , she 

evaluated images, does not believe patient needs to be admitted , wants follow 

up in the office 





Patient has been made aware of the findings and I have instructed that she 

follow-up in the office for probable hysterectomy planning





After performing a Medical Screening Examination, I estimate there is LOW risk 

for ACUTE APPENDICITIS, BOWEL OBSTRUCTION, ACUTE CHOLECYSTITIS, PERFORATED 

DIVERTICULITIS, INCARCERATED HERNIA, PANCREATITIS, PELVIC INFLAMMATORY DISEASE, 

PERFORATED ULCER, ECTOPIC PREGNANCY, or TUBO-OVARIAN ABSCESS, thus I consider 

the discharge disposition reasonable. Also, there is no evidence or peritonitis

, sepsis, or toxicity. I have reevaluated this patient multiple times and no 

significant life threatening changes are noted. The patient and I have 

discussed the diagnosis and risks, and we agree with discharging home with 

close follow-up with the understanding that symptoms and presentations can 

change. We also discussed returning to the Emergency Department immediately if 

new or worsening symptoms occur. We have discussed the symptoms which are most 

concerning (e.g., bloody stool, fever, changing or worsening pain, vomiting) 

that necessitate immediate return.





- Vital Signs


Vital signs: 


 











Temp Pulse Resp BP Pulse Ox


 


 98.7 F   74   18   133/86 H  99 


 


 10/03/17 15:13  10/03/17 15:13  10/03/17 15:13  10/03/17 15:13  10/03/17 15:13














- Laboratory


Result Diagrams: 


 10/03/17 15:45





 10/03/17 15:45


Laboratory results interpreted by me: 


 











  10/03/17 10/03/17





  15:45 15:45


 


RDW  17.1 H 


 


Ur Leukocyte Esterase   TRACE H














- Diagnostic Test


Radiology reviewed: Image reviewed, Reports reviewed - Findings discussed with 

patient radiologist and OB/GYN





Discharge





- Discharge


Clinical Impression: 


 Pelvic pain





Uterine fibroid


Qualifiers:


 Uterine leiomyoma location: unspecified location Qualified Code(s): D25.9 - 

Leiomyoma of uterus, unspecified





Condition: Stable


Disposition: HOME, SELF-CARE


Instructions:  Pelvic Pain ([facility])


Prescriptions: 


Hydrocodone/Acetaminophen [Norco 5-325 mg Tablet] 1 tab PO Q6 #14 tablet


Referrals: 


BIB HESTER MD [ACTIVE STAFF] - Follow up tomorrow

## 2017-10-03 NOTE — ER DOCUMENT REPORT
ED Medical Screen (RME)





- General


Chief Complaint: Pelvic Pain


Stated Complaint: ABDOMINAL PAIN


Time Seen by Provider: 10/03/17 15:34


Notes: 





Patient is complaining of lower abdominal pain that started about 2 days ago.  

She has had some nausea and is vomited a couple of times.  Also having some 

diarrhea.  Pain is located in the lower mid suprapubic region.  She is having 

burning with urination.  Has not had any fever.


Patient was seen here about a month ago and is suspected of having gallbladder 

disease, although she did not show stones on her gallbladder bladder 

ultrasound.  She has been scheduled by her primary care provider to have a HIDA 

scan done in about a week.





Patient has had her appendix removed.  She had a tubal ligation done 21 years 

ago and then had a child 2 years later, but has not had any more children since 

then.  Hypertension.


TRAVEL OUTSIDE OF THE [place] IN LAST 30 DAYS: No





- Related Data


Allergies/Adverse Reactions: 


 





No Known Allergies Allergy (Verified 10/03/17 15:09)


 











Past Medical History





- Social History


Chew tobacco use (# tins/day): No


Frequency of alcohol use: None


Drug Abuse: None





- Past Medical History


Cardiac Medical History: Reports: Hx Hypertension - Off lisinopril for months.  

Says she can't afford it despite it being $4


Pulmonary Medical History: Reports: Hx COPD


Renal/ Medical History: Reports: Hx Kidney Stones.  Denies: Hx Peritoneal 

Dialysis


Musculoskeltal Medical History: Reports Hx Musculoskeletal Trauma


Psychiatric Medical History: Reports: Hx Anxiety, Hx Depression


Traumatic Medical History: Reports: Hx Fractures - Wrist


Past Surgical History: Reports: Hx Appendectomy, Hx Tubal Ligation





- Immunizations


Hx Diphtheria, Pertussis, Tetanus Vaccination: No


History of Influenza Vaccine for 10/2017 - 3/2018 Season: No





Physical Exam





- Vital signs


Vitals: 





 











Temp Pulse Resp BP Pulse Ox


 


 98.7 F   74   18   133/86 H  99 


 


 10/03/17 15:13  10/03/17 15:13  10/03/17 15:13  10/03/17 15:13  10/03/17 15:13














Course





- Vital Signs


Vital signs: 





 











Temp Pulse Resp BP Pulse Ox


 


 98.7 F   74   18   133/86 H  99 


 


 10/03/17 15:13  10/03/17 15:13  10/03/17 15:13  10/03/17 15:13  10/03/17 15:13

## 2017-10-03 NOTE — RADIOLOGY REPORT (SQ)
EXAM DESCRIPTION:  U/S NON OB PEL TV W/DOPPLER



COMPLETED DATE/TIME:  10/3/2017 6:14 pm



REASON FOR STUDY:  pelvic pain, hx of polyp



COMPARISON:  4/17/2017



TECHNIQUE:  Dynamic and static grayscale images acquired of the pelvis via transvaginal approach and 
recorded on PACS. Additional selected color Doppler and spectral images recorded.



LIMITATIONS:  None.



FINDINGS:  UTERUS: Contour normal.  There is somewhat hypoechoic area in the lower uterine segment me
asuring 13 x 13 x 11 mm suggestive of a small fibroid.

ENDOMETRIAL STRIPE: The endometrium is thickened with a slightly complex appearance.

CERVIX: 2.6 cm.  No nabothian cysts.

RIGHT OVARY: There is a 2.1 x 1.6 x 1.5 cm complex hypoechoic area with peripheral increased blood fl
ow.

RIGHT OVARY DOPPLER: Normal arterial vascular flow without evidence for torsion.

LEFT OVARY: Normal ovary.  No masses.

LEFT OVARY DOPPLER: Normal arterial vascular flow without evidence for torsion.

FREE FLUID: There is a small amount of free fluid.

OTHER: No other significant finding.

MEASUREMENTS:

UTERUS: 7.9 x 4.7 x 4.4 cm.

ENDOMETRIAL STRIPE: 1.3 cm.

RIGHT OVARY: 2.8 x 1.8 x 1.9 cm.

LEFT OVARY: 2.4 x 1.6 x 1.2 cm.



IMPRESSION:  1.  Small uterine fibroid.

2.  Thickened endometrium.  Is the patient postmenopausal?

3.  Hypoechoic area associated with the right ovary with so called ring of fire blood flow that can b
e seen with an ectopic pregnancy.  Correlate clinically.



TECHNICAL DOCUMENTATION:  JOB ID:  [id number]

 2011 [employer]- All Rights Reserved

## 2017-11-27 ENCOUNTER — HOSPITAL ENCOUNTER (EMERGENCY)
Dept: HOSPITAL 62 - ER | Age: 47
LOS: 1 days | Discharge: HOME | End: 2017-11-28
Payer: COMMERCIAL

## 2017-11-27 VITALS — SYSTOLIC BLOOD PRESSURE: 175 MMHG | DIASTOLIC BLOOD PRESSURE: 87 MMHG

## 2017-11-27 DIAGNOSIS — M25.532: Primary | ICD-10-CM

## 2017-11-27 PROCEDURE — 73110 X-RAY EXAM OF WRIST: CPT

## 2017-11-27 PROCEDURE — 99284 EMERGENCY DEPT VISIT MOD MDM: CPT

## 2017-11-27 PROCEDURE — L3908 WHO COCK-UP NONMOLDE PRE OTS: HCPCS

## 2017-11-28 NOTE — ER DOCUMENT REPORT
HPI





- HPI


Patient complains to provider of: left wrist pain


Pain Level: 5


Context: 





Patient is a right-hand-dominant 47-year-old female presents emergency 

department complaining of left wrist pain.  Patient states that she is a 

previous history of fracture of the left wrist that did not get operated on due 

to issues with insurance when she was living in [place].  This is 

approximately 12-year-old injury.  She states that she has not had any recent 

injuries, falls, trauma to the site.  States that she takes Advil as needed at 

home for pain but over the past couple of days her pains become more 

significant.  Primary care is Dr. Almendarez





- CONSTITUTIONAL


Constitutional: DENIES: Fever, Chills





- EENT


EENT: DENIES: Sore Throat, Ear Pain, Eye problems





- NEURO


Neurology: DENIES: Headache, Weakness, Vision blurred, Dizzinesss / Vertigo





- CARDIOVASCULAR


Cardiovascular: DENIES: Chest pain





- RESPIRATORY


Respiratory: DENIES: Trouble Breathing, Coughing





- GASTROINTESTINAL


Gastrointestinal: DENIES: Abdominal Pain, Black / Bloody Stools





- URINARY


Urinary: DENIES: Dysuria, Urgency, Frequency





- REPRODUCTIVE


Reproductive: DENIES: Pregnant:





- MUSCULOSKELETAL


Musculoskeletal: REPORTS: Extremity pain - L wrist





Past Medical History





- Social History


Smoking Status: Unknown if Ever Smoked


Family History: CAD, CVA, DM, Hyperlipidemia, Hypertension, Malignancy


Patient has suicidal ideation: No


Patient has homicidal ideation: No





- Past Medical History


Cardiac Medical History: Reports: Hx Hypertension - Off lisinopril for months.  

Says she can't afford it despite it being $4


Pulmonary Medical History: Reports: Hx COPD


Renal/ Medical History: Reports: Hx Kidney Stones.  Denies: Hx Peritoneal 

Dialysis


Musculoskeltal Medical History: Reports Hx Musculoskeletal Trauma


Psychiatric Medical History: Reports: Hx Anxiety, Hx Depression


Traumatic Medical History: Reports: Hx Fractures - Wrist


Past Surgical History: Reports: Hx Appendectomy, Hx Tubal Ligation





- Immunizations


Hx Diphtheria, Pertussis, Tetanus Vaccination: No





Vertical Provider Document





- CONSTITUTIONAL


Agree With Documented VS: Yes


Notes: 





PHYSICAL EXAM


GENERAL: Alert, interacts well. 


HEAD: Normocephalic, atraumatic.


EYES: Pupils equal, round, and reactive to light. Extraocular movements intact.


ENT: Oral mucosa moist, tongue midline. 


NECK: Full range of motion. Supple. Trachea midline.


LUNGS: Clear to auscultation bilaterally, no wheezes, rales, or rhonchi. No 

respiratory distress.


HEART: Regular rate and rhythm. No murmurs, gallops, or rubs.


ABDOMEN: Soft,  nondistended, nontender. No guarding, rebound, or rigidity.. 

Bowel sounds present in all 4 quadrants.


EXTREMITIES: Moves all 4 extremities spontaneously. No edema, radial and 

dorsalis pedis pulses 2/4 bilaterally. No cyanosis. 


NEUROLOGICAL: Alert and oriented x4. Normal speech.


PSYCH: Normal affect, normal mood.


SKIN: Warm, dry, normal turgor. No rashes or lesions noted.








- INFECTION CONTROL


TRAVEL OUTSIDE OF THE [place] IN LAST 30 DAYS: No





- RESPIRATORY


O2 Sat by Pulse Oximetry: 98





Course





- Re-evaluation


Re-evalutation: 





11/28/17 01:59


Patient is a 47-year-old female presents with nontraumatic acute on chronic 

left wrist pain.  No evidence of new injury on x-ray.  Extremity is 

neurovascularly intact.  Patient offered a brace for comfort.  Otherwise 

discussed with her anti-inflammatories for pain and she can follow-up with Dr. Almendarez.  Patient agrees this plan and is stable for discharge home





- Vital Signs


Vital signs: 


 











Temp Pulse Resp BP Pulse Ox


 


 98.3 F   78   18   175/87 H  98 


 


 11/27/17 23:05  11/27/17 23:05  11/27/17 23:05  11/27/17 23:05  11/27/17 23:05














- Diagnostic Test


Radiology reviewed: Image reviewed, Reports reviewed





Discharge





- Discharge


Clinical Impression: 


 Left wrist pain





Condition: Good


Disposition: HOME, SELF-CARE


Additional Instructions: 


Please follow-up with your primary care doctor for reevaluation of your 

complaint today.


Your presentation today is likely related to arthritis developed from her 

previous wrist injury.  This can be managed with anti-inflammatories.  

Otherwise primary care can initiate follow-up with either pain management or 

physical therapy.


Prescriptions: 


Meloxicam [Mobic] 7.5 mg PO BID #20 tablet


Referrals: 


APRIL ALMENDAREZ MD [Primary Care Provider] - Follow up in 1 week

## 2017-11-28 NOTE — RADIOLOGY REPORT (SQ)
EXAM DESCRIPTION:  WRIST LEFT 3 VIEWS



COMPLETED DATE/TIME:  11/28/2017 1:10 am



REASON FOR STUDY:  pain s/p injury



COMPARISON:  None.



NUMBER OF VIEWS:  Three views.



TECHNIQUE:  AP, lateral, and oblique radiographic images acquired of the left wrist.



LIMITATIONS:  None.



FINDINGS:  MINERALIZATION: Normal.

BONES: No acute fracture or dislocation. Normal alignment.  Well corticated calcification at the ulna
r styloid and deformity of the distal radius are probably due to prior trauma.

SOFT TISSUES: No soft tissue swelling.  No radiopaque foreign body.



IMPRESSION:  No radiographic evidence of acute injury.



TECHNICAL DOCUMENTATION:  JOB ID:  [id number]

[id number]

 2011 [employer]- All Rights Reserved

## 2017-12-14 ENCOUNTER — HOSPITAL ENCOUNTER (EMERGENCY)
Dept: HOSPITAL 62 - ER | Age: 47
Discharge: HOME | End: 2017-12-14
Payer: COMMERCIAL

## 2017-12-14 VITALS — DIASTOLIC BLOOD PRESSURE: 95 MMHG | SYSTOLIC BLOOD PRESSURE: 189 MMHG

## 2017-12-14 DIAGNOSIS — J44.9: ICD-10-CM

## 2017-12-14 DIAGNOSIS — F17.200: ICD-10-CM

## 2017-12-14 DIAGNOSIS — Z90.49: ICD-10-CM

## 2017-12-14 DIAGNOSIS — K21.0: Primary | ICD-10-CM

## 2017-12-14 DIAGNOSIS — Z87.42: ICD-10-CM

## 2017-12-14 DIAGNOSIS — I10: ICD-10-CM

## 2017-12-14 DIAGNOSIS — R10.13: ICD-10-CM

## 2017-12-14 DIAGNOSIS — Z98.51: ICD-10-CM

## 2017-12-14 DIAGNOSIS — R11.0: ICD-10-CM

## 2017-12-14 DIAGNOSIS — Z87.442: ICD-10-CM

## 2017-12-14 DIAGNOSIS — B35.4: ICD-10-CM

## 2017-12-14 LAB
ALBUMIN SERPL-MCNC: 4.3 G/DL (ref 3.5–5)
ALP SERPL-CCNC: 86 U/L (ref 38–126)
ALT SERPL-CCNC: 29 U/L (ref 9–52)
ANION GAP SERPL CALC-SCNC: 12 MMOL/L (ref 5–19)
APPEARANCE UR: CLEAR
AST SERPL-CCNC: 16 U/L (ref 14–36)
BASOPHILS # BLD AUTO: 0.1 10^3/UL (ref 0–0.2)
BASOPHILS NFR BLD AUTO: 0.6 % (ref 0–2)
BILIRUB DIRECT SERPL-MCNC: 0 MG/DL (ref 0–0.4)
BILIRUB SERPL-MCNC: < 0.1 MG/DL (ref 0.2–1.3)
BILIRUB UR QL STRIP: NEGATIVE
BUN SERPL-MCNC: 13 MG/DL (ref 7–20)
CALCIUM: 9.4 MG/DL (ref 8.4–10.2)
CHLORIDE SERPL-SCNC: 105 MMOL/L (ref 98–107)
CO2 SERPL-SCNC: 25 MMOL/L (ref 22–30)
CREAT SERPL-MCNC: 0.66 MG/DL (ref 0.52–1.25)
EOSINOPHIL # BLD AUTO: 0.1 10^3/UL (ref 0–0.6)
EOSINOPHIL NFR BLD AUTO: 1.4 % (ref 0–6)
ERYTHROCYTE [DISTWIDTH] IN BLOOD BY AUTOMATED COUNT: 16 % (ref 11.5–14)
GLUCOSE SERPL-MCNC: 79 MG/DL (ref 75–110)
GLUCOSE UR STRIP-MCNC: NEGATIVE MG/DL
HCT VFR BLD CALC: 36 % (ref 36–47)
HGB BLD-MCNC: 12.3 G/DL (ref 12–15.5)
HGB HCT DIFFERENCE: 0.9
KETONES UR STRIP-MCNC: NEGATIVE MG/DL
LIPASE SERPL-CCNC: 178.2 U/L (ref 23–300)
LYMPHOCYTES # BLD AUTO: 2.5 10^3/UL (ref 0.5–4.7)
LYMPHOCYTES NFR BLD AUTO: 26.1 % (ref 13–45)
MCH RBC QN AUTO: 28.1 PG (ref 27–33.4)
MCHC RBC AUTO-ENTMCNC: 34.3 G/DL (ref 32–36)
MCV RBC AUTO: 82 FL (ref 80–97)
MONOCYTES # BLD AUTO: 0.6 10^3/UL (ref 0.1–1.4)
MONOCYTES NFR BLD AUTO: 6.7 % (ref 3–13)
NEUTROPHILS # BLD AUTO: 6.3 10^3/UL (ref 1.7–8.2)
NEUTS SEG NFR BLD AUTO: 65.2 % (ref 42–78)
NITRITE UR QL STRIP: NEGATIVE
PH UR STRIP: 8 [PH] (ref 5–9)
POTASSIUM SERPL-SCNC: 4.2 MMOL/L (ref 3.6–5)
PROT SERPL-MCNC: 6.9 G/DL (ref 6.3–8.2)
PROT UR STRIP-MCNC: NEGATIVE MG/DL
RBC # BLD AUTO: 4.39 10^6/UL (ref 3.72–5.28)
SODIUM SERPL-SCNC: 141.8 MMOL/L (ref 137–145)
SP GR UR STRIP: 1.01
UROBILINOGEN UR-MCNC: NEGATIVE MG/DL (ref ?–2)
WBC # BLD AUTO: 9.7 10^3/UL (ref 4–10.5)

## 2017-12-14 PROCEDURE — 36415 COLL VENOUS BLD VENIPUNCTURE: CPT

## 2017-12-14 PROCEDURE — 83690 ASSAY OF LIPASE: CPT

## 2017-12-14 PROCEDURE — 99284 EMERGENCY DEPT VISIT MOD MDM: CPT

## 2017-12-14 PROCEDURE — 96361 HYDRATE IV INFUSION ADD-ON: CPT

## 2017-12-14 PROCEDURE — 80053 COMPREHEN METABOLIC PANEL: CPT

## 2017-12-14 PROCEDURE — 81025 URINE PREGNANCY TEST: CPT

## 2017-12-14 PROCEDURE — 85025 COMPLETE CBC W/AUTO DIFF WBC: CPT

## 2017-12-14 PROCEDURE — 81001 URINALYSIS AUTO W/SCOPE: CPT

## 2017-12-14 PROCEDURE — 96374 THER/PROPH/DIAG INJ IV PUSH: CPT

## 2017-12-14 PROCEDURE — 74176 CT ABD & PELVIS W/O CONTRAST: CPT

## 2017-12-14 PROCEDURE — 96375 TX/PRO/DX INJ NEW DRUG ADDON: CPT

## 2017-12-14 NOTE — ER DOCUMENT REPORT
ED GI/





- General


Chief Complaint: Abdominal Pain


Stated Complaint: ABDOMINAL PAIN


Time Seen by Provider: 12/14/17 16:33


Mode of Arrival: Ambulatory


Notes: 





47 years old female with a history of ovarian cysts presents today with upper 

abdominal pain for the last few days.  Associated with nausea, denies any 

vomiting.  Denies any diarrhea or constipation.  Denies any fever chills or 

other constitutional symptoms.


TRAVEL OUTSIDE OF THE [place] IN LAST 30 DAYS: No





- Related Data


Allergies/Adverse Reactions: 


 





No Known Allergies Allergy (Verified 12/14/17 16:11)


 











Past Medical History





- General


Information source: Patient





- Social History


Smoking Status: Current Every Day Smoker


Chew tobacco use (# tins/day): No


Frequency of alcohol use: None


Drug Abuse: None


Family History: CAD, CVA, DM, Hyperlipidemia, Hypertension, Malignancy


Patient has suicidal ideation: No


Patient has homicidal ideation: No





- Past Medical History


Cardiac Medical History: Reports: Hx Hypertension - Off lisinopril for months.  

Says she can't afford it despite it being $4


Pulmonary Medical History: Reports: Hx COPD


Renal/ Medical History: Reports: Hx Kidney Stones.  Denies: Hx Peritoneal 

Dialysis


Musculoskeltal Medical History: Reports Hx Musculoskeletal Trauma


Psychiatric Medical History: Reports: Hx Anxiety, Hx Depression


Traumatic Medical History: Reports: Hx Fractures - Wrist


Past Surgical History: Reports: Hx Appendectomy, Hx Tubal Ligation





- Immunizations


Hx Diphtheria, Pertussis, Tetanus Vaccination: No





Review of Systems





- Review of Systems


Notes: 





REVIEW OF SYSTEMS:


CONSTITUTIONAL :  Denies fever,  chills, or sweats.  Denies recent illness.


EENT:   Denies eye, ear, throat, or mouth pain or symptoms.  Denies nasal or 

sinus congestion or discharge.  Denies throat, tongue, or mouth swelling or 

difficulty swallowing.


CARDIOVASCULAR:  Denies chest pain.  Denies palpitations or racing or irregular 

heart beat.  Denies ankle edema.


RESPIRATORY:  Denies cough, cold, or chest congestion.  Denies shortness of 

breath, difficulty breathing, or wheezing.


GASTROINTESTINAL:  .  Denies nausea, vomiting, or diarrhea.  Denies blood in 

vomitus, stools, or per rectum.  Denies black, tarry stools.  Denies 

constipation. 


GENITOURINARY:  Denies difficulty urinating, painful urination, burning, 

frequency, blood in urine, or discharge.


FEMALE  GENITOURINARY:  Denies vaginal bleeding, heavy or abnormal periods, 

irregular periods.  Denies vaginal discharge or odor. 


MUSCULOSKELETAL:  Denies back or neck pain or stiffness.  Denies joint pain or 

swelling.


SKIN:   Denies rash, lesions or sores.


HEMATOLOGIC :   Denies easy bruising or bleeding.


LYMPHATIC:  Denies swollen, enlarged glands.


NEUROLOGICAL:  Denies confusion or altered mental status.  Denies passing out 

or loss of consciousness.  Denies dizziness or lightheadedness.  Denies 

headache.  Denies weakness or paralysis or loss of use of either side.  Denies 

problems with gait or speech.  Denies sensory loss, numbness, or tingling.  

Denies seizures.


PSYCHIATRIC:  Denies anxiety or stress.  Denies depression, suicidal ideation, 

or homicidal ideation.





ALL OTHER SYSTEMS REVIEWED AND NEGATIVE.











PHYSICAL EXAMINATION:





GENERAL: Well-appearing, well-nourished and in no acute distress.





HEAD: Atraumatic, normocephalic.





EYES: Pupils equal round and reactive to light, extraocular movements intact, 

conjunctiva are normal.





ENT: Nares patent, oropharynx clear without exudates.  Moist mucous membranes.





NECK: Normal range of motion, supple without lymphadenopathy





LUNGS: Breath sounds clear to auscultation bilaterally and equal.  No wheezes 

rales or rhonchi.





HEART: Regular rate and rhythm without murmurs





ABDOMEN: Soft, tenderness over the left upper quadrant, right side of the 

abdomen, no rebound tenderness or guarding.  Positive bowel sounds in all 4 

quadrants.  Nondistended abdomen.  No guarding, no rebound.  No masses 

appreciated.





Female : deferred





Musculoskeletal: Normal range of motion, no pitting or edema.  No cyanosis.





NEUROLOGICAL: Cranial nerves grossly intact.  Normal speech, normal gait.  

Normal sensory, motor exams





PSYCH: Normal mood, normal affect.





SKIN: Warm, Dry, normal turgor, no rashes or lesions noted.  Has tinea corporis 

over the left upper shoulder region

















Dictation was performed using Dragon voice recognition software





Course





- Re-evaluation


Re-evalutation: 





12/14/17 20:13


Lab reports were explained to the patient, the reason for the abdominal pain is 

GERD





- Laboratory


Result Diagrams: 


 12/14/17 16:57





 12/14/17 16:57


Laboratory results interpreted by me: 


 











  12/14/17 12/14/17





  16:57 16:57


 


RDW  16.0 H 


 


Total Bilirubin   < 0.1 L














- Diagnostic Test


Radiology results interpreted by me: 





12/14/17 20:12


CT of the abdomen reported by radiologist was reviewed, negative finding.





Discharge





- Discharge


Clinical Impression: 


 GERD with esophagitis





Abdominal pain


Qualifiers:


 Abdominal location: epigastric Qualified Code(s): R10.13 - Epigastric pain





Condition: Good


Disposition: HOME, SELF-CARE


Instructions:  Abdominal Pain ([facility]), Antacid Therapy ([facility]), Esophagitis ([facility])


Prescriptions: 


Pantoprazole Sodium [Protonix] 40 mg PO DAILY #30 tablet.


Sucralfate [Carafate 1 gm Tablet] 1 gm PO ACHS #120 tablet

## 2017-12-14 NOTE — ER DOCUMENT REPORT
ED Medical Screen (RME)





- General


Chief Complaint: Abdominal Pain


Stated Complaint: ABDOMINAL PAIN


Time Seen by Provider: 12/14/17 16:33


Mode of Arrival: Ambulatory


Information source: Patient


Notes: 





Patient states that she has pain in her abdomen just superior to her umbilicus.

  She states she has been told before that she has a hernia in this area.  She 

states her only previous abdominal surgery was a tubal ligation.  She states 

for several days now she has had bloating and pain in this area with decreased 

ability to have a bowel movement.  She also has burning and nausea.


TRAVEL OUTSIDE OF THE [place] IN LAST 30 DAYS: No





- Related Data


Allergies/Adverse Reactions: 


 





No Known Allergies Allergy (Verified 12/14/17 16:11)


 











Past Medical History





- Social History


Chew tobacco use (# tins/day): No


Frequency of alcohol use: None


Drug Abuse: None





- Past Medical History


Cardiac Medical History: Reports: Hx Hypertension - Off lisinopril for months.  

Says she can't afford it despite it being $4


Pulmonary Medical History: Reports: Hx COPD


Renal/ Medical History: Reports: Hx Kidney Stones.  Denies: Hx Peritoneal 

Dialysis


Musculoskeltal Medical History: Reports Hx Musculoskeletal Trauma


Psychiatric Medical History: Reports: Hx Anxiety, Hx Depression


Traumatic Medical History: Reports: Hx Fractures - Wrist


Past Surgical History: Reports: Hx Appendectomy, Hx Tubal Ligation





- Immunizations


Hx Diphtheria, Pertussis, Tetanus Vaccination: No


History of Influenza Vaccine for 10/2017 - 3/2018 Season: No

## 2017-12-14 NOTE — RADIOLOGY REPORT (SQ)
EXAM DESCRIPTION:  CT ABD/PELVIS NO ORAL OR IV



COMPLETED DATE/TIME:  12/14/2017 5:30 pm



REASON FOR STUDY:  Abdominal pain, ovarian cyst



COMPARISON:  8/4/2016



TECHNIQUE:  CT scan of the abdomen and pelvis performed without intravenous or oral contrast. Images 
reviewed with lung, soft tissue, and bone windows. Reconstructed coronal and sagittal MPR images revi
ewed. All images stored on PACS.

All CT scanners at this facility use dose modulation, iterative reconstruction, and/or weight based d
osing when appropriate to reduce radiation dose to as low as reasonably achievable (ALARA).

[facility]: Dose Right  [facility]: CareDose    [facility]: Dose Right    [facility]: Teradose 4D    [facility]: Smart Technologies



RADIATION DOSE:  CT Rad equipment meets quality standard of care and radiation dose reduction techniq
ues were employed. CTDIvol: 4.8 mGy. DLP: 214 mGy-cm.mGy.



LIMITATIONS:  None.



FINDINGS:  LOWER CHEST: No significant findings. No nodules or infiltrates.

NON-CONTRASTED LIVER, SPLEEN, ADRENALS: Evaluation limited by lack of IV contrast. No identified sign
ificant masses.

PANCREAS: No masses. No peripancreatic inflammatory changes.

GALLBLADDER: No identified stones by CT criteria. No inflammatory changes to suggest cholecystitis.

RIGHT KIDNEY AND URETER: Stable appearance of the presumed cyst.  .   No significant calcifications. 
  No hydronephrosis or hydroureter.

LEFT KIDNEY AND URETER: No suspicious masses. Assessment limited by lack of IV contrast.   No signifi
cant calcifications.   No hydronephrosis or hydroureter.

AORTA AND RETROPERITONEUM: No aneurysm. No retroperitoneal masses or adenopathy.

BOWEL AND PERITONEAL CAVITY: No obvious masses or inflammatory changes. No free fluid.

APPENDIX: Surgically absent.

PELVIS, BLADDER, AND ABDOMINAL WALL:No abnormal masses. No free fluid. Bladder normal.

BONES: No significant findings.

OTHER: No other significant finding.



IMPRESSION:  NO SIGNIFICANT OR ACUTE PROCESS IN THE ABDOMEN OR PELVIS.



COMMENT:  Quality ID # [id number]: Final reports with documentation of one or more dose reduction techniques
 (e.g., Automated exposure control, adjustment of the mA and/or kV according to patient size, use of 
iterative reconstruction technique)



TECHNICAL DOCUMENTATION:  JOB ID:  [id number]

 2011 [employer]- All Rights Reserved

## 2018-01-26 ENCOUNTER — HOSPITAL ENCOUNTER (EMERGENCY)
Dept: HOSPITAL 62 - ER | Age: 48
Discharge: HOME | End: 2018-01-26
Payer: COMMERCIAL

## 2018-01-26 VITALS — DIASTOLIC BLOOD PRESSURE: 80 MMHG | SYSTOLIC BLOOD PRESSURE: 133 MMHG

## 2018-01-26 DIAGNOSIS — J44.9: ICD-10-CM

## 2018-01-26 DIAGNOSIS — S29.011A: Primary | ICD-10-CM

## 2018-01-26 DIAGNOSIS — F17.200: ICD-10-CM

## 2018-01-26 DIAGNOSIS — Z87.442: ICD-10-CM

## 2018-01-26 DIAGNOSIS — Z98.51: ICD-10-CM
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
## 2018-01-26 DIAGNOSIS — I10: ICD-10-CM

## 2018-01-26 DIAGNOSIS — R05: ICD-10-CM

## 2018-01-26 PROCEDURE — 99283 EMERGENCY DEPT VISIT LOW MDM: CPT

## 2018-01-26 PROCEDURE — 71046 X-RAY EXAM CHEST 2 VIEWS: CPT

## 2018-01-26 NOTE — ER DOCUMENT REPORT
ED Respiratory Problem





- General


Chief Complaint: Rib Pain


Stated Complaint: CHEST PAIN, DIFFICULTY BREATHING


Time Seen by Provider: 01/26/18 16:40


Mode of Arrival: Ambulatory


Information source: Patient


TRAVEL OUTSIDE OF THE [place] IN LAST 30 DAYS: No





- HPI


Patient complains to provider of: Cough, Hurts to breath


Notes: 





Patient arrives with complaints of right-sided chest pain.  She states that she 

believes she had influenza within the last week she had had cough body aches 

and fever.  She states that the fever and the body aches have resolved but for 

the last few days she has had right-sided chest pain.  She states that the pain 

hurts with touching her ribs as well as coughing or taking a deep breath.  She 

denies any shortness of breath.  She does reports that it hurts to take a deep 

breath.  She denies any nausea, vomiting, diarrhea.  She denies abdominal pain.

  She denies rash.  She denies injuries.  She is on blood thinning medications.

  She denies any recent long trips or surgeries, she denies any leg pain or leg 

swelling, she denies any history of cancer, no history of DVT or PE, she is not 

on hormones.  She is a smoker, but states that she has not smoked for the last 

week.  She has a history of hypertension, but denies history of high cholesterol

, diabetes, CAD.  She takes lisinopril but no other medications.  She denies 

any other complaints at this time.





- Related Data


Allergies/Adverse Reactions: 


 





No Known Allergies Allergy (Verified 01/26/18 15:51)


 











Past Medical History





- Social History


Smoking Status: Current Every Day Smoker


Family History: CAD, CVA, DM, Hyperlipidemia, Hypertension, Malignancy





- Past Medical History


Cardiac Medical History: Reports: Hx Hypertension - Off lisinopril for months.  

Says she can't afford it despite it being $4


Pulmonary Medical History: Reports: Hx COPD


Renal/ Medical History: Reports: Hx Kidney Stones.  Denies: Hx Peritoneal 

Dialysis


Musculoskeltal Medical History: Reports Hx Musculoskeletal Trauma


Psychiatric Medical History: Reports: Hx Anxiety, Hx Depression


Traumatic Medical History: Reports: Hx Fractures - Wrist


Past Surgical History: Reports: Hx Appendectomy, Hx Tubal Ligation





- Immunizations


Hx Diphtheria, Pertussis, Tetanus Vaccination: No





Review of Systems





- Review of Systems


-: Yes All other systems reviewed and negative





Physical Exam





- Vital signs


Vitals: 


 











Temp Pulse BP Pulse Ox


 


 98.6 F   83   140/73 H  97 


 


 01/26/18 16:12  01/26/18 16:12  01/26/18 16:12  01/26/18 16:12














- Notes


Notes: 





GENERAL: alert, cooperative, nontoxic, no distress.


HEAD: normocephalic, atraumatic


EYES: conjunctiva pink without discharge, no external redness or swelling.


EARS: no external swelling, no external redness, no mastoid redness, swelling, 

tenderness.  Ear canals are clear without swelling or drainage.  TMs pearly gray

, no redness, no bulging, normal landmarks, no perforation.


NOSE: atraumatic, no external swelling. clear rhinorrhea noted.


MOUTH/THROAT: mucous membranes moist and pink, posterior pharynx without 

erythema, swelling, exudate. No trismus or drooling.


NECK: soft, supple, full range of motion, no meningismus.


CHEST: no distress, lungs clear and equal throughout.  No wheezing, rales, 

rhonchi.  Tenderness to palpation of the right posterior lateral and anterior 

chest wall.  No crepitus.  No bruising.  No rash.


ABDO: No tenderness to palpation.  Soft, no rebound tenderness or guarding.


CARDIAC: regular rate and rhythm, no murmur, normal capillary refill, normal 

pulses.  No peripheral edema noted.


BACK: full range of motion, no CVA tenderness.


EXTREMITIES: full range of motion of all extremities.  No redness, no swelling.


NEURO: alert and oriented A&O3, no focal deficits, full range of motion of all 

extremities.


PYSCH: appropriate mood, affect.  Patient is cooperative.


SKIN: pink, warm, dry, no rash.





Course





- Re-evaluation


Re-evalutation: 





01/26/18 17:06


Patient is PERC rule negative for PE, PE is very unlikely in this patient, 

therefore no further evaluation is required for ruling out PE.  Patient has had 

a cough for the last week and now has reproducible right-sided chest wall 

tenderness to palpation it is worse with deep breaths and cough.  This seems to 

be most likely chest wall in nature although pneumonia is another possibility.  

The pain does not appear to be consistent with ACS.  I offered EKG and blood 

work to further evaluate her right-sided reproducible chest pain, but the 

patient declined at this time.  She would prefer to see what her chest x-ray 

shows she thinks that this is either a pulled muscle in her chest or possibly 

pneumonia.


01/26/18 17:26


The patient is nontoxic appearing with stable vitals.  She had a cough for 

about a week and is now having right-sided chest wall pain when coughing or 

taking deep breaths.  The pain is also worse with palpation.  Patient had an x-

ray showing no acute abnormality per the radiologist.  This is most likely 

secondary to chest wall strain from her frequent cough.  Again the patient has 

no PE risk factors and is PERC rule negative.  I offered to perform a cardiac 

workup on the patient, but she declined at this time.  I believe this is 

reasonable due to the fact that she has had a recent cough and her pain is 

completely reproducible with palpation as well as cough and deep breath.  She 

will be discharged home with a prescription for Naprosyn and Hycodan.  

Instructions to follow-up with her primary care doctor if not better in 5-7 

days.  She was instructed to return the emergency department immediately if she 

develops worsening pain, high fever, difficulty breathing, or any further 

concerns.





The patient is noted to have elevated blood pressure during today's emergency 

department visit.  The patient was informed of this finding.  The patient was 

instructed that this may be related to pre-hypertension and requires further 

evaluation with a primary care provider.  The patient has no hypertensive 

symptoms at this time.





The patient's emergency department workup and current diagnosis were explained 

to the patient and or family.  Follow-up instructions were provided.  

Medications if prescribed were discussed. Instructions for when to return to 

the emergency department including specific  worrisome symptoms were discussed 

with the patient and/or family.





- Vital Signs


Vital signs: 


 











Temp Pulse Resp BP Pulse Ox


 


 98.6 F   83      140/73 H  97 


 


 01/26/18 16:12  01/26/18 16:12     01/26/18 16:12  01/26/18 16:12














- Diagnostic Test


Radiology reviewed: Image reviewed, Reports reviewed - No acute abnormality of 

the chest





Discharge





- Discharge


Clinical Impression: 


 Cough





Chest wall muscle strain


Qualifiers:


 Encounter type: initial encounter Qualified Code(s): S29.011A - Strain of 

muscle and tendon of front wall of thorax, initial encounter





Condition: Stable


Disposition: HOME, SELF-CARE


Instructions:  Influenza ([facility]), Chest Wall Pain ([facility])


Additional Instructions: 


Take medications as prescribed.  Stop smoking.  Follow-up with your doctor if 

not improved in the next 5-7 days, sooner for increasing pain, high fever, 

difficulty breathing, persistent vomiting, or for any further concerns.





Your blood pressure was elevated during today's visit.  Have this rechecked 

with your doctor.





The medication you were prescribed today may cause drowsiness.  Do not drive or 

operate heavy machinery while taking this medication.


Prescriptions: 


Hydrocodone Bit/Homatropine [Hycodan Syrup 5-1.5 mg/5 ml Ud Cup] 5 ml PO Q4HP 

PRN #60 ml


 PRN Reason: 


Naproxen [Naprosyn] 500 mg PO BID #20 tablet


Forms:  Elevated Blood Pressure, Smoking Cessation Education


Referrals: 


APRIL RIGGINS MD [Primary Care Provider] - Follow up as needed

## 2018-01-26 NOTE — RADIOLOGY REPORT (SQ)
EXAM DESCRIPTION:  CHEST PA/LAT



COMPLETED DATE/TIME:  1/26/2018 4:52 pm



REASON FOR STUDY:  right chest pain with cough, recent flu



COMPARISON:  8/11/2016



EXAM PARAMETERS:  NUMBER OF VIEWS: two views

TECHNIQUE: Digital Frontal and Lateral radiographic views of the chest acquired.

RADIATION DOSE: NA

LIMITATIONS: none



FINDINGS:  LUNGS AND PLEURA: No opacities, masses or pneumothorax. No pleural effusion.

MEDIASTINUM AND HILAR STRUCTURES: No masses or contour abnormalities.

HEART AND VASCULAR STRUCTURES: Heart normal size.  No evidence for failure.

BONES: No acute findings.

HARDWARE: None in the chest.

OTHER: No other significant finding.



IMPRESSION:  NO SIGNIFICANT RADIOGRAPHIC FINDING IN THE CHEST.



TECHNICAL DOCUMENTATION:  JOB ID:  [id number]

 2011 [employer]- All Rights Reserved

## 2018-03-29 NOTE — ER DOCUMENT REPORT
ED Medical Screen (RME)





- General


Chief Complaint: Abdominal Pain


Stated Complaint: LIP SWELLING/FLANK PAIN


Time Seen by Provider: 03/29/18 15:49


Notes: 





48-year-old female to the emergency department complaining of blisters on her 

lips as well as right upper quadrant and right flank abdominal pain.  Nausea 

and vomiting.





I have greeted and performed a rapid initial assessment of this patient.  A 

comprehensive ED assessment and evaluation of the patient, analysis of test 

results and completion of the medical decision making process will be conducted 

by additional ED providers.


TRAVEL OUTSIDE OF THE [place] IN LAST 30 DAYS: No





- Related Data


Allergies/Adverse Reactions: 


 





No Known Allergies Allergy (Verified 03/29/18 15:10)


 











Past Medical History





- Social History


Chew tobacco use (# tins/day): No


Frequency of alcohol use: None


Drug Abuse: None





- Past Medical History


Cardiac Medical History: Reports: Hx Hypertension - Off lisinopril for months.  

Says she can't afford it despite it being $4


Pulmonary Medical History: Reports: Hx COPD


Renal/ Medical History: Reports: Hx Kidney Stones.  Denies: Hx Peritoneal 

Dialysis


Musculoskeltal Medical History: Reports Hx Musculoskeletal Trauma


Psychiatric Medical History: Reports: Hx Anxiety, Hx Depression


Traumatic Medical History: Reports: Hx Fractures - Wrist


Past Surgical History: Reports: Hx Appendectomy, Hx Tubal Ligation





- Immunizations


Hx Diphtheria, Pertussis, Tetanus Vaccination: No


History of Influenza Vaccine for 10/2017 - 3/2018 Season: No





Physical Exam





- Vital signs


Vitals: 





 











Temp Pulse Resp BP Pulse Ox


 


 98.7 F   81   20   190/106 H  98 


 


 03/29/18 15:13  03/29/18 15:13  03/29/18 15:13  03/29/18 15:13  03/29/18 15:13














Course





- Vital Signs


Vital signs: 





 











Temp Pulse Resp BP Pulse Ox


 


 98.7 F   81   20   190/106 H  98 


 


 03/29/18 15:13  03/29/18 15:13  03/29/18 15:13  03/29/18 15:13  03/29/18 15:13

## 2018-03-29 NOTE — RADIOLOGY REPORT (SQ)
EXAM DESCRIPTION:  U/S ABDOMEN LIMITED W/O DOP



COMPLETED DATE/TIME:  3/29/2018 6:59 pm



REASON FOR STUDY:  Right upper quadrant pain



COMPARISON:  9/26/2017



TECHNIQUE:  Dynamic and static grayscale images acquired of the abdomen and recorded on PACS. Ignaciao
susanna selected color Doppler and spectral images recorded.



LIMITATIONS:  None.



FINDINGS:  PANCREAS: No masses.  Visualized pancreatic duct normal caliber.

LIVER: 13 cm.  Normal echotexture.

LIVER VASCULATURE: Normal directional flow of the main portal vein and hepatic veins.

GALLBLADDER: No stones are seen.  Wall thickness is normal.  There were some gallbladder polyps.

ULTRASOUND-DETECTED LUNDY'S SIGN: Posterior

INTRAHEPATIC DUCTS AND COMMON DUCT: CBD and intrahepatic ducts normal caliber. No filling defects.

INFERIOR VENA CAVA: Not well seen.

AORTA: No aneurysm.

RIGHT KIDNEY:  Normal size, 10.1 cm. Normal echogenicity. No solid or suspicious masses.  Small cyst.
  No hydronephrosis. No calcifications.

PERITONEAL AND RIGHT PLEURAL SPACE: No ascites or effusions.

OTHER: No other significant findings.



IMPRESSION:  Gallbladder polyps was seen and there was a positive ultrasound detected Lundy sign.  N
o gallstones are seen.  There is no evidence of acute cholecystitis.



TECHNICAL DOCUMENTATION:  JOB ID:  [id number]

 2011 [employer]- All Rights Reserved



Reading location - IP/workstation name: [email or web address]

## 2018-03-29 NOTE — ER DOCUMENT REPORT
ED GI/





- General


Chief Complaint: Abdominal Pain


Stated Complaint: LIP SWELLING/FLANK PAIN


Time Seen by Provider: 03/29/18 15:49


Mode of Arrival: Ambulatory


Information source: Patient


Notes: 


History of complain 48 years old_female presents today with right flank pain, 

right side of the abdominal pain, including right upper quadrant.  This is been 

going on for the last several days.  Increase in intensity recently.  Each time 

she moves around turns increases the pain.  Not associated with any nausea 

vomiting, or eating any food.  Denies any diarrhea or constipation.  Denies any 

dysuria frequency urgency.  Denies any fever chills or other constitutional 

symptoms.  She states she has been evaluated for this before.


REVIEW OF SYSTEMS:


CONSTITUTIONAL :  Denies fever,  chills, or sweats.  Denies recent illness.


EENT:   Denies eye, ear, throat, or mouth pain or symptoms.  Denies nasal or 

sinus congestion or discharge.  Denies throat, tongue, or mouth swelling or 

difficulty swallowing.


CARDIOVASCULAR:  Denies chest pain.  Denies palpitations or racing or irregular 

heart beat.  Denies ankle edema.


RESPIRATORY:  Denies cough, cold, or chest congestion.  Denies shortness of 

breath, difficulty breathing, or wheezing.


GASTROINTESTINAL:  n.  Denies nausea, vomiting, or diarrhea.  Denies blood in 

vomitus, stools, or per rectum.  Denies black, tarry stools.  Denies 

constipation. 


GENITOURINARY:  Denies difficulty urinating, painful urination, burning, 

frequency, blood in urine, or discharge.


FEMALE  GENITOURINARY:  Denies vaginal bleeding, heavy or abnormal periods, 

irregular periods.  Denies vaginal discharge or odor. 


MUSCULOSKELETAL:  Denies back or neck pain or stiffness.  Denies joint pain or 

swelling.


SKIN:   Denies rash, lesions or sores.


HEMATOLOGIC :   Denies easy bruising or bleeding.


LYMPHATIC:  Denies swollen, enlarged glands.


NEUROLOGICAL:  Denies confusion or altered mental status.  Denies passing out 

or loss of consciousness.  Denies dizziness or lightheadedness.  Denies 

headache.  Denies weakness or paralysis or loss of use of either side.  Denies 

problems with gait or speech.  Denies sensory loss, numbness, or tingling.  

Denies seizures.


PSYCHIATRIC:  Denies anxiety or stress.  Denies depression, suicidal ideation, 

or homicidal ideation.





ALL OTHER SYSTEMS REVIEWED AND NEGATIVE.











PHYSICAL EXAMINATION:





GENERAL: Well-appearing, well-nourished and in no acute distress.





HEAD: Atraumatic, normocephalic.





EYES: Pupils equal round and reactive to light, extraocular movements intact, 

conjunctiva are normal.





ENT: Nares patent, oropharynx clear without exudates.  Moist mucous membranes.





NECK: Normal range of motion, supple without lymphadenopathy





LUNGS: Breath sounds clear to auscultation bilaterally and equal.  No wheezes 

rales or rhonchi.





HEART: Regular rate and rhythm without murmurs





ABDOMEN: Soft, tenderness on palpation no noted over the right of the abdomen 

as well as right upper quadrant as well as epigastric region.  No rebound 

tenderness or guarding.  She also had tenderness over the right flank including 

paraspinal muscles.  No guarding, no rebound.  No masses appreciated.





Female : deferred





Musculoskeletal: Normal range of motion, no pitting or edema.  No cyanosis.





NEUROLOGICAL: Cranial nerves grossly intact.  Normal speech, normal gait.  

Normal sensory, motor exams





PSYCH: Normal mood, normal affect.





SKIN: Warm, Dry, normal turgor, no rashes or lesions noted.

















Dictation was performed using Dragon voice recognition software


TRAVEL OUTSIDE OF THE [place] IN LAST 30 DAYS: No





- Related Data


Allergies/Adverse Reactions: 


 





No Known Allergies Allergy (Verified 03/29/18 15:10)


 











Past Medical History





- Social History


Smoking Status: Current Some Day Smoker


Chew tobacco use (# tins/day): No


Frequency of alcohol use: None


Drug Abuse: None


Family History: CAD, CVA, DM, Hyperlipidemia, Hypertension, Malignancy


Patient has suicidal ideation: No


Patient has homicidal ideation: No





- Past Medical History


Cardiac Medical History: Reports: Hx Hypertension - Off lisinopril for months.  

Says she can't afford it despite it being $4


Pulmonary Medical History: Reports: Hx COPD


Renal/ Medical History: Reports: Hx Kidney Stones.  Denies: Hx Peritoneal 

Dialysis


Musculoskeltal Medical History: Reports Hx Musculoskeletal Trauma


Psychiatric Medical History: Reports: Hx Anxiety, Hx Depression


Traumatic Medical History: Reports: Hx Fractures - Wrist


Past Surgical History: Reports: Hx Appendectomy, Hx Tubal Ligation





- Immunizations


Hx Diphtheria, Pertussis, Tetanus Vaccination: No





Physical Exam





- Vital signs


Vitals: 


 











Temp Pulse Resp BP Pulse Ox


 


 98.7 F   81   20   190/106 H  98 


 


 03/29/18 15:13  03/29/18 15:13  03/29/18 15:13  03/29/18 15:13  03/29/18 15:13














Course





- Re-evaluation


Re-evalutation: 





03/30/18 01:33


Patient remained calm and sleepy without any discomfort.





- Vital Signs


Vital signs: 


 











Temp Pulse Resp BP Pulse Ox


 


 98.4 F   81   16   117/65   99 


 


 03/29/18 19:05  03/29/18 15:13  03/29/18 20:31  03/29/18 20:31  03/29/18 20:31














- Laboratory


Result Diagrams: 


 03/29/18 16:40





 03/29/18 16:40


Laboratory results interpreted by me: 


 











  03/29/18 03/29/18 03/29/18





  15:40 16:40 16:40


 


WBC   11.5 H 


 


MCH   26.5 L 


 


RDW   18.8 H 


 


Absolute Neutrophils   8.3 H 


 


Potassium    3.5 L


 


BUN    3 L


 


Creatinine    0.50 L


 


Urine Ketones  25 H  


 


Urine Blood  LARGE H  














- Diagnostic Test


Radiology reviewed: Reports reviewed - 1.  Ultrasound of the gallbladder came 

back negative for cholecystitis 2.  CT of the abdomen reported by radiologist 

as negative no acute finding





Discharge





- Discharge


Clinical Impression: 


Abdominal pain


Qualifiers:


 Abdominal location: right upper quadrant Qualified Code(s): R10.11 - Right 

upper quadrant pain





Hematuria


Qualifiers:


 Hematuria type: other microscopic Qualified Code(s): R31.29 - Other 

microscopic hematuria; R31.2 - Other microscopic hematuria





UTI (urinary tract infection)


Qualifiers:


 Urinary tract infection type: acute cystitis Hematuria presence: with 

hematuria Qualified Code(s): N30.01 - Acute cystitis with hematuria





Condition: Fair


Disposition: HOME, SELF-CARE


Instructions:  Abdominal Pain ([facility]), Urinary Tract Infection ([facility])


Prescriptions: 


Ketorolac Tromethamine [Toradol 10 mg Tablet] 10 mg PO Q8HP PRN #14 tablet


 PRN Reason: 


Ciprofloxacin HCl [Cipro 500 mg Tablet] 500 mg PO BID #20 tablet

## 2018-03-30 NOTE — RADIOLOGY REPORT (SQ)
EXAM DESCRIPTION:

CT ABD/PELVIS NO ORAL OR IV



CLINICAL HISTORY:

48 years Female, Hematuria, rule out kidney stone, flank pain



COMPARISON:

CT, 12/14/2017, 8/18/2017, report only. Ultrasound, 3/29/2018,

8/4/2016.



TECHNIQUE:

No contrast.  Coronal and sagittal reformat.  This exam was

performed according to our departmental dose-optimization

program, which includes automated exposure control, adjustment of

the mA and/or kV according to patient size and/or use of

iterative reconstruction technique.



FINDINGS:



No acute findings.



Likely benign 2.2 cm, 11HU  right renal low-attenuation lesion

consistent with prior exams including prior ultrasound report

8/4/2016, not definitively characterized on today's exam.

Atherosclerosis. Small bibasilar atelectasis or scar. Unenhanced

inferior chest, abdominopelvic structures, and musculoskeleton

appear otherwise grossly unremarkable.



Impression:  No acute findings.

## 2018-04-06 NOTE — ER DOCUMENT REPORT
ED Animal Bite





- General


Chief Complaint: Dog Bite


Stated Complaint: DOG BITE/POSSIBLE RASH


Time Seen by Provider: 04/06/18 17:48


Mode of Arrival: Ambulatory


Information source: Patient


TRAVEL OUTSIDE OF THE [place] IN LAST 30 DAYS: No





- HPI


Patient complains to provider of: right finger dog bite


Severity of injury: Bitten


Onset: Other - 2 days


Animal's immunizations: Unknown


Notes: 





Patient is here with complaints of dog bite to right middle fingertip which 

occurred 2 days ago.  She states that her neighbor has multiple dogs and there 

is a hole in the fence.  The dog puts head through the fence and bit her right 

finger.  She states that they are unsure if the dog's immunizations are up-to-

date, but the dog is currently quarantined and under watch by animal control 

per her report.  Patient states she has some pain to the right fingertip.  No 

significant redness.  She denies any numbness, tingling, weakness.  She denies 

any chest pain or shortness of breath.  She denies any vomiting or diarrhea.  

The patient's tetanus is up-to-date as of 2 years ago.  Patient states that 

today she started to get very itchy on her arms thought she had some hives and 

now she has some excoriated areas that are burning where she scratched.  She 

denies any rash currently.  The rash was only associated with the dorsums of 

her hands or forearms, no truncal rash, no leg rash.





- Related Data


Allergies/Adverse Reactions: 


 





No Known Allergies Allergy (Verified 04/06/18 17:38)


 











Past Medical History





- Social History


Smoking Status: Current Every Day Smoker


Family History: CAD, CVA, DM, Hyperlipidemia, Hypertension, Malignancy





- Past Medical History


Cardiac Medical History: Reports: Hx Hypertension - Off lisinopril for months.  

Says she can't afford it despite it being $4


Pulmonary Medical History: Reports: Hx COPD


Renal/ Medical History: Reports: Hx Kidney Stones.  Denies: Hx Peritoneal 

Dialysis


Musculoskeltal Medical History: Reports Hx Musculoskeletal Trauma


Psychiatric Medical History: Reports: Hx Anxiety, Hx Depression


Traumatic Medical History: Reports: Hx Fractures - Wrist


Past Surgical History: Reports: Hx Appendectomy, Hx Tubal Ligation





- Immunizations


Hx Diphtheria, Pertussis, Tetanus Vaccination: No





Review of Systems





- Review of Systems


-: Yes All other systems reviewed and negative





Physical Exam





- Vital signs


Vitals: 


 











Temp Pulse Resp BP Pulse Ox


 


 98.1 F   102 H  16   172/87 H  97 


 


 04/06/18 17:39  04/06/18 17:39  04/06/18 17:39  04/06/18 17:39  04/06/18 17:39














- Notes


Notes: 





GENERAL: alert, cooperative, nontoxic, no distress.


HEAD: normocephalic, atraumatic


EYES: conjunctiva pink without discharge, no external redness or swelling.


EARS: no external swelling, no external redness


NOSE: atraumatic, no external swelling


MOUTH/THROAT: mucous membranes moist and pink


NECK: soft, supple, full range of motion, no meningismus.


CHEST: no distress, lungs clear and equal throughout.  No wheezing, rales, 

rhonchi.


CARDIAC: regular rate and rhythm, no murmur, normal capillary refill, normal 

pulses.  


BACK: full range of motion, no CVA tenderness.


EXTREMITIES: full range of motion of all extremities.  Small linear subungual 

hematoma to the right middle fingernail with a small abrasion at the base of 

the nail and abrasion to the middle phalanx.  Minimal redness around these 

wounds.  Mild tenderness to palpation.  No purulent drainage.  Full flexion and 

extension of the finger.  Normal cap refill and sensation distally.  No redness 

extending up the hand.  Hand exam is unremarkable.


NEURO: alert and oriented 3, no focal deficits, full range of motion of all 

extremities.


PYSCH: appropriate mood, affect.  Patient is cooperative.


SKIN: pink, warm, dry, red linear excoriated areas to the dorsums of the hands 

and forearms consistent with scratching.  No visible rash noted at this time.  

No rash in any other places at this time.








Course





- Re-evaluation


Re-evalutation: 





04/06/18 19:35


Patient is nontoxic appearing with stable vitals.  The patient was bit by a dog 

on the right middle finger 2 days ago.  She was told to come to be evaluated 

for this.  Wounds appear to be healing well.  There is minimal redness around 

each wound.  There is no signs of obvious significant infection.  No sign of 

flexor tenosynovitis.  Full range of motion.  No foreign body.  No fever.  X-

ray shows no acute fracture or foreign body.  Patient was given a dose of 

Augmentin here in the emergency department as well as a dose of tramadol for 

pain.  She will be discharged home with instructions to clean wound twice a day 

with soap and water, take antibiotics as prescribed, follow-up for increasing 

pain, fever, redness, drainage, numbness, tingling, weakness, any further 

concerns.  The dog is currently being watched by animal control and the patient 

will be contacted by the health department if rabies vaccine and immunoglobulin 

are required.  Since the dog is currently being quarantined, this is not 

necessary today.





The patient is noted to have elevated blood pressure during today's emergency 

department visit.  The patient was informed of this finding.  The patient was 

instructed that this may be related to pre-hypertension and requires further 

evaluation with a primary care provider.  The patient has no hypertensive 

symptoms at this time.





The patient's emergency department workup and current diagnosis were explained 

to the patient and or family.  Follow-up instructions were provided.  

Medications if prescribed were discussed. Instructions for when to return to 

the emergency department including specific  worrisome symptoms were discussed 

with the patient and/or family.





- Vital Signs


Vital signs: 


 











Temp Pulse Resp BP Pulse Ox


 


 98.1 F   102 H  16   172/87 H  97 


 


 04/06/18 17:39  04/06/18 17:39  04/06/18 17:39  04/06/18 17:39  04/06/18 17:39














- Diagnostic Test


Radiology reviewed: Image reviewed, Reports reviewed - Negative x-ray of the 

right hand





Discharge





- Discharge


Clinical Impression: 


Dog bite of right hand without complication


Qualifiers:


 Encounter type: initial encounter Qualified Code(s): S61.451A - Open bite of 

right hand, initial encounter; W54.0XXA - Bitten by dog, initial encounter; 

W54.0XXA - Bitten by dog, initial encounter





Condition: Stable


Disposition: HOME, SELF-CARE


Instructions:  Animal Bites ([facility])


Additional Instructions: 


Take medications as prescribed.  Clean wound twice a day with soap and water 

and apply a thin layer of bacitracin.  Take over-the-counter Benadryl or 

Claritin for your rash and apply over-the-counter hydrocortisone cream to the 

rash.  Follow-up with the health department regarding the need for rabies 

vaccine and immunoglobulin.  Follow-up for increasing pain, fever, redness, 

numbness, tingling, weakness, or for any further concerns.





Your blood pressure was elevated during today's visit.  Have this rechecked 

with your doctor.





The medication you were prescribed today may cause drowsiness.  Do not drive or 

operate heavy machinery while taking this medication.


Prescriptions: 


Tramadol HCl [Ultram 50 mg Tablet] 50 mg PO Q6HP PRN #12 tablet


 PRN Reason: 


Amox Tr/Potassium Clavulanate [Augmentin 875-125 Tablet] 1 tab PO BID 5 Days #

10 tablet


Forms:  Elevated Blood Pressure, Smoking Cessation Education, Return to Work

## 2018-04-06 NOTE — RADIOLOGY REPORT (SQ)
EXAM DESCRIPTION:  HAND RIGHT 3 VIEWS



COMPLETED DATE/TIME:  4/6/2018 6:32 pm



REASON FOR STUDY:  dog bite



COMPARISON:  None.



EXAM PARAMETERS:  NUMBER OF VIEWS: Three views.

TECHNIQUE: AP, lateral and oblique  radiographic images acquired of the right hand.

LIMITATIONS: None.



FINDINGS:  MINERALIZATION: Normal.

BONES: No acute fracture or dislocation.  No worrisome bone lesions.

JOINTS: No effusions.

SOFT TISSUES: No soft tissue swelling.  No foreign body.

OTHER: No other significant finding.



IMPRESSION:  NEGATIVE STUDY OF THE RIGHT HAND. NO RADIOGRAPHIC EVIDENCE OF ACUTE INJURY.



TECHNICAL DOCUMENTATION:  JOB ID:  [id number]

 2011 [employer]- All Rights Reserved



Reading location - IP/workstation name: [email or web address]

## 2018-05-08 NOTE — RADIOLOGY REPORT (SQ)
EXAM DESCRIPTION:  ELBOW LEFT OVER 2 VIEWS



COMPLETED DATE/TIME:  5/8/2018 5:15 pm



REASON FOR STUDY:  Pain s/p injury



COMPARISON:  None.



NUMBER OF VIEWS:  Four views.



TECHNIQUE:  AP, lateral, and both oblique radiographic images acquired of the left elbow.



LIMITATIONS:  None.



FINDINGS:  MINERALIZATION: Normal.

BONES: No acute fracture or dislocation.  No worrisome bone lesions.

JOINT: No effusion.

SOFT TISSUES: No soft tissue swelling.  No foreign body.

OTHER: No other significant finding.



IMPRESSION:  NEGATIVE STUDY OF THE LEFT ELBOW. NO RADIOGRAPHIC EVIDENCE OF ACUTE INJURY.



TECHNICAL DOCUMENTATION:  JOB ID:  [id number]

 2011 [employer]- All Rights Reserved



Reading location - IP/workstation name: [email or web address]

## 2018-05-08 NOTE — ER DOCUMENT REPORT
ED Extremity Problem, Upper





- General


Chief Complaint: Elbow Injury


Stated Complaint: ELBOW INJURY


Time Seen by Provider: 05/08/18 17:11


Mode of Arrival: Ambulatory


Information source: Patient


TRAVEL OUTSIDE OF THE [place] IN LAST 30 DAYS: No





- HPI


Patient complains to provider of: Injury, Left, Elbow


Notes: 





Patient is here with complaints of left elbow pain.  She states that she hit 

her elbow on a metal counter several times a few days ago and now has pain in 

the left elbow.  She states that when she tries to pick something up the pain 

radiates down her arm.  She denies any other injuries.  No numbness, tingling, 

weakness.  No redness.  No fever.  No chest pain or shortness of breath.  No 

rash.  No other complaints.  Pain is worse with movement, better with rest.





- Related Data


Allergies/Adverse Reactions: 


 





No Known Allergies Allergy (Verified 04/06/18 17:38)


 











Past Medical History





- Social History


Smoking Status: Current Every Day Smoker


Family History: CAD, CVA, DM, Hyperlipidemia, Hypertension, Malignancy





- Past Medical History


Cardiac Medical History: Reports: Hx Hypertension - Off lisinopril for months.  

Says she can't afford it despite it being $4


Pulmonary Medical History: Reports: Hx COPD


Renal/ Medical History: Reports: Hx Kidney Stones.  Denies: Hx Peritoneal 

Dialysis


Musculoskeltal Medical History: Reports Hx Musculoskeletal Trauma


Psychiatric Medical History: Reports: Hx Anxiety, Hx Depression


Traumatic Medical History: Reports: Hx Fractures - Wrist


Past Surgical History: Reports: Hx Appendectomy, Hx Tubal Ligation





- Immunizations


Hx Diphtheria, Pertussis, Tetanus Vaccination: No





Review of Systems





- Review of Systems


-: Yes All other systems reviewed and negative





Physical Exam





- Vital signs


Vitals: 





 











Temp Pulse Resp BP Pulse Ox


 


 99.1 F   83   16   187/91 H  98 


 


 05/08/18 16:59  05/08/18 16:59  05/08/18 16:59  05/08/18 16:59  05/08/18 16:59














- Notes


Notes: 





GENERAL: alert, cooperative, nontoxic, no distress.


HEAD: normocephalic, atraumatic


EYES: conjunctiva pink without discharge, no external redness or swelling.


EARS: no external swelling, no external redness


NOSE: atraumatic, no external swelling


MOUTH/THROAT: mucous membranes moist and pink


NECK: soft, supple, full range of motion, no meningismus.


CHEST: no distress, lungs clear and equal throughout.  No wheezing, rales, 

rhonchi.


CARDIAC: regular rate and rhythm, no murmur, normal capillary refill, normal 

pulses.  


BACK: full range of motion, no CVA tenderness.


EXTREMITIES: full range of motion of all extremities.  No redness, no swelling.

  Tenderness to palpation of the left olecranon.  No redness.  Full range of 

motion.  Skin is intact.  Normal pulse and sensation distally.  Compartments 

are soft.


NEURO: alert and oriented 3, no focal deficits, full range of motion of all 

extremities.


PYSCH: appropriate mood, affect.  Patient is cooperative.


SKIN: pink, warm, dry, no rash.








Course





- Re-evaluation


Re-evalutation: 





05/08/18 17:55


Patient is nontoxic appearing with stable vitals.  She is here with complaints 

of left elbow pain.  She had it several times a few days ago continues to have 

pain.  Pain seems to be worse when she tries to pick something up or move it.  

No signs of infection.  Skin is intact.  Normal neurovascular exam.  No signs 

of compartment syndrome.  X-ray shows no acute abnormality.  Patient was placed 

in an Ace wrap for comfort.  She will be discharged home with prescription for 

Voltaren.  She is instructed to rest, ice, elevate.  Follow-up if not better in 

1 week, sooner for worsening pain, fever, numbness, tingling, weakness, any 

further concerns.





The patient is noted to have elevated blood pressure during today's emergency 

department visit.  The patient was informed of this finding.  The patient was 

instructed that this may be related to pre-hypertension and requires further 

evaluation with a primary care provider.  The patient has no hypertensive 

symptoms at this time.





The patient's emergency department workup and current diagnosis were explained 

to the patient and or family.  Follow-up instructions were provided.  

Medications if prescribed were discussed. Instructions for when to return to 

the emergency department including specific  worrisome symptoms were discussed 

with the patient and/or family.





- Vital Signs


Vital signs: 





 











Temp Pulse Resp BP Pulse Ox


 


 99.1 F   83   16   187/91 H  98 


 


 05/08/18 16:59  05/08/18 16:59  05/08/18 16:59  05/08/18 16:59  05/08/18 16:59














- Diagnostic Test


Radiology reviewed: Image reviewed, Reports reviewed - Left elbow negative





Procedures





- Immobilization


  ** left elbow


Pre-Proc Neuro Vasc Exam: Normal


Immobilizer type: Ace wrap


Performed by: PCT


Post-Proc Neuro Vasc Exam: Normal


Alignment checked and good: Yes





Discharge





- Discharge


Clinical Impression: 


Left elbow contusion


Qualifiers:


 Encounter type: initial encounter Qualified Code(s): S50.02XA - Contusion of 

left elbow, initial encounter





Condition: Stable


Disposition: HOME, SELF-CARE


Instructions:  Contusion ([facility]), Nerve Contusion ([facility])


Additional Instructions: 


Wear Ace wrap as needed for comfort.  Apply ice to sore area.  Take medications 

as prescribed.  Follow-up if not better in 1 week, sooner for worsening pain, 

fever, redness, numbness, tingling, weakness, any further concerns.





Your blood pressure was elevated during today's visit.  Have this rechecked 

with your doctor.


Prescriptions: 


Diclofenac Sodium [Voltaren 50 Mg Tablet.] 50 mg PO BID #20 tablet.dr


Forms:  Elevated Blood Pressure, Smoking Cessation Education, Return to Work


Referrals: 


APRIL RIGGINS MD [Primary Care Provider] - Follow up as needed

## 2018-05-21 NOTE — ER DOCUMENT REPORT
ED General





- General


Chief Complaint: Chest Pain


Stated Complaint: HEADACHE


Time Seen by Provider: 05/21/18 17:06


Mode of Arrival: Ambulatory


Notes: 





Patient is a 48-year-old female without chronic medical problems who presents 

with 36 hours of a progressively worsening migraine type headache.  The patient 

describes a vice-like sensation to her bitemporal and frontal skull that is a 

throbbing, aching pain.  She notes that lights, sounded movement worsen the 

headache.  Nothing improves the headache.  She notes a history of similar 

symptoms in the past.  She has not seen her primary care doctor regarding today'

s concerns.  She denies any focal weakness, numbness, fever, neck pain or 

confusion.  She states that she has had some intermittent paresthesias to her 

left upper extremity and has had some mild chest discomfort that she attributes 

to anxiety.  She has no known history of cardiac pathology.  She denies any 

chest discomfort at the time of my assessment.


TRAVEL OUTSIDE OF THE [place] IN LAST 30 DAYS: No





- Related Data


Allergies/Adverse Reactions: 


 





No Known Allergies Allergy (Verified 04/06/18 17:38)


 











Past Medical History





- General


Information source: Patient





- Social History


Smoking Status: Current Every Day Smoker


Chew tobacco use (# tins/day): No


Frequency of alcohol use: None


Drug Abuse: None


Lives with: Spouse/Significant other


Family History: CAD, CVA, DM, Hyperlipidemia, Hypertension, Malignancy


Patient has suicidal ideation: No


Patient has homicidal ideation: No





- Past Medical History


Cardiac Medical History: Reports: Hx Hypertension - Off lisinopril for months.  

Says she can't afford it despite it being $4


Pulmonary Medical History: Reports: Hx COPD


Renal/ Medical History: Reports: Hx Kidney Stones.  Denies: Hx Peritoneal 

Dialysis


Musculoskeltal Medical History: Reports Hx Musculoskeletal Trauma


Psychiatric Medical History: Reports: Hx Anxiety, Hx Depression


Traumatic Medical History: Reports: Hx Fractures - Wrist


Past Surgical History: Reports: Hx Appendectomy, Hx Tubal Ligation





- Immunizations


Hx Diphtheria, Pertussis, Tetanus Vaccination: No





Review of Systems





- Review of Systems


Notes: 





Constitutional: Negative for fever.


HENT: Negative for sore throat.


Eyes: Negative for visual changes.


Cardiovascular: Positive for chest pain now resolved


Respiratory: Negative for shortness of breath.


Gastrointestinal: Negative for abdominal pain, vomiting or diarrhea.


Genitourinary: Negative for dysuria.


Musculoskeletal: Negative for back pain.


Skin: Negative for rash.


Neurological: positive for left upper extremity paresthesias and headache





10 point ROS negative except as marked above and in HPI.





Physical Exam





- Vital signs


Vitals: 


 











Temp Pulse Resp BP Pulse Ox


 


 98.7 F   84   13   180/77 H  99 


 


 05/21/18 15:48  05/21/18 15:48  05/21/18 15:48  05/21/18 15:48  05/21/18 15:48











Interpretation: Hypertensive


Notes: 





PHYSICAL EXAMINATION:





GENERAL: Well-appearing, well-nourished and in no acute distress.





HEAD: Atraumatic, normocephalic.





EYES: Pupils equal round and reactive to light, extraocular movements intact, 

sclera anicteric, conjunctiva are normal.





ENT: nares patent, oropharynx clear without exudates.  Moist mucous membranes.





NECK: Normal range of motion, supple without lymphadenopathy





LUNGS: Breath sounds clear to auscultation bilaterally and equal.  No wheezes 

rales or rhonchi.





HEART: Regular rate and rhythm without murmurs





ABDOMEN: Soft, nontender, normoactive bowel sounds.  No guarding, no rebound.  

No masses appreciated.





EXTREMITIES: Normal range of motion, no pitting or edema.  No cyanosis.





NEUROLOGICAL: Face symmetric.  Tongue protrudes midline.  Extraocular motions 

intact.  Pupils are 2 mm and equally reactive.  Normal speech, normal gait.  5 

out of 5 strength in both the distal and proximal upper and lower extremities 

bilaterally.  Sensation is grossly intact throughout.  Finger to nose testing 

normal.  Pronator drift normal.





PSYCH: Normal mood, normal affect.





SKIN: Warm, Dry, normal turgor, no rashes or lesions noted.





Course





- Re-evaluation


Re-evalutation: 





05/21/18 23:33


Presentation of a headache that appears to be most consistent with tension 

versus migrainous type headache.  Headache was not maximal in onset, patient 

has no focal neurologic deficits, no nuchal rigidity, vital signs within normal 

limits, no papilledema, and patient is overall well in appearance.  Based on 

clinical history and examination I do not suspect an acute subarachnoid 

hemorrhage, dural venous sinus thrombosis, acute meningitis, or intercranial 

mass.  Given my low clinical suspicion for any acute life-threatening etiology, 

I do not feel advanced neuro imaging or laboratory testing is indicated at this 

time.  However in triage a CT of the head was obtained and is noted to be 

normal.  Labs otherwise unremarkable as the patient apparently did complain of 

some left-sided chest discomfort or headache.  This seems to be more consistent 

with a complex migraine headache as this has completely resolved after she 

received a migraine cocktail.  2 troponins have been obtained and are noted to 

be normal.  EKG unremarkable.  I do not suspect a cardiac etiology of her left 

arm tingling.  At this time will discharge with return precautions and follow-

up recommendations.  Verbal discharge instructions given a the bedside and 

opportunity for questions given. Medication warnings reviewed. Patient is in 

agreement with this plan and has verbalized understanding of return precautions 

and the need for primary care follow-up in the next 24-72 hours.





- Vital Signs


Vital signs: 


 











Temp Pulse Resp BP Pulse Ox


 


 98.1 F   72   18   155/83 H  99 


 


 05/21/18 23:47  05/21/18 23:47  05/21/18 23:47  05/21/18 23:47  05/21/18 23:47














- Laboratory


Result Diagrams: 


 05/21/18 18:00





 05/21/18 18:00


Laboratory results interpreted by me: 


 











  05/21/18





  18:00


 


WBC  11.8 H


 


MCV  79 L


 


MCH  25.6 L


 


RDW  15.9 H














- Diagnostic Test


Radiology reviewed: Image reviewed, Reports reviewed


Radiology results interpreted by me: 





05/21/18 23:34


CT head: No acute intracranial bleed or mass





Chest x-ray: No acute infiltrate





- EKG Interpretation by Me


Additional EKG results interpreted by me: 





05/22/18 04:08


Sinus rhythm.  Rate 71.  No ST elevations or depressions.  QTC is 448.





Discharge





- Discharge


Clinical Impression: 


 Numbness and tingling in left arm





Migraine headache


Qualifiers:


 Migraine type: unspecified Status migrainosus presence: with status 

migrainosus Intractability: not intractable Qualified Code(s): G43.901 - 

Migraine, unspecified, not intractable, with status migrainosus





Chest pain


Qualifiers:


 Chest pain type: unspecified Qualified Code(s): R07.9 - Chest pain, unspecified





Condition: Good


Disposition: HOME, SELF-CARE


Additional Instructions: 


You were seen today for a migraine headache.  Please follow-up with your 

primary care doctor regarding today's ED visit.  Return to emergency department 

immediately if you develop a headache that gets to its maximum severity within 

20 minutes of onset, you pass out, you develop weakness, numbness, changes in 

your vision, become unable to keep any fluids down for more than 12 hours, or 

develop a fever greater than 100.4 degrees Fahrenheit.





If you develop a similar migraine headache in the future I recommend that you 

immediately take 600 mg of ibuprofen and 50 mg of Benadryl and go to sleep as 

quickly as possible.  This can often prevent your migraine headache from 

becoming severe.

## 2018-05-21 NOTE — ER DOCUMENT REPORT
ED Medical Screen (RME)





- General


Chief Complaint: Chest Pain


Stated Complaint: HEADACHE


Time Seen by Provider: 05/21/18 17:06


Mode of Arrival: Ambulatory


Information source: Patient


Notes: 





Patient presents complaining of right-sided headache pain for the past 3 days.  

Patient states she has a history of migraines that this pain feels similar.  

Patient reports nausea and vomiting 2 episodes today.  Patient states that 

while she has been waiting here in the lobby she has started to develop left 

upper chest pain that goes into the left upper arm.  Patient does have a 

history of hypertension and states she ran out of her lisinopril 1 week ago.





hx: Migraine, hypertension, tubal ligation, appendectomy





I have greeted and performed a rapid initial assessment of this patient.  A 

comprehensive ED assessment and evaluation of the patient, analysis of test 

results and completion of the medical decision making process will be conducted 

by additional ED providers.


TRAVEL OUTSIDE OF THE [place] IN LAST 30 DAYS: No





- Related Data


Allergies/Adverse Reactions: 


 





No Known Allergies Allergy (Verified 04/06/18 17:38)


 











Past Medical History





- Social History


Chew tobacco use (# tins/day): No


Frequency of alcohol use: None


Drug Abuse: None





- Past Medical History


Cardiac Medical History: Reports: Hx Hypertension - Off lisinopril for months.  

Says she can't afford it despite it being $4


Pulmonary Medical History: Reports: Hx COPD


Renal/ Medical History: Reports: Hx Kidney Stones.  Denies: Hx Peritoneal 

Dialysis


Musculoskeltal Medical History: Reports Hx Musculoskeletal Trauma


Psychiatric Medical History: Reports: Hx Anxiety, Hx Depression


Traumatic Medical History: Reports: Hx Fractures - Wrist


Past Surgical History: Reports: Hx Appendectomy, Hx Tubal Ligation





- Immunizations


Hx Diphtheria, Pertussis, Tetanus Vaccination: No


History of Influenza Vaccine for 10/2017 - 3/2018 Season: No





Physical Exam





- Vital signs


Vitals: 





 











Temp Pulse Resp BP Pulse Ox


 


 98.7 F   84   13   180/77 H  99 


 


 05/21/18 15:48  05/21/18 15:48  05/21/18 15:48  05/21/18 15:48  05/21/18 15:48














- Cardiovascular


Rhythm: Regular


Heart sounds: S1 appreciated, S2 appreciated





- Neurological


Neuro grossly intact: Yes


Cognition: Normal


[place]go Coma Scale Eye Opening: Spontaneous


[place] Coma Scale Verbal: Oriented


Diego Coma Scale Motor: Obeys Commands


[place] Coma Scale Total: 15





Course





- Vital Signs


Vital signs: 





 











Temp Pulse Resp BP Pulse Ox


 


 98.7 F   84   13   180/77 H  99 


 


 05/21/18 15:48  05/21/18 15:48  05/21/18 15:48  05/21/18 15:48  05/21/18 15:48

## 2018-05-21 NOTE — RADIOLOGY REPORT (SQ)
EXAM DESCRIPTION:  CT HEAD WITHOUT



COMPLETED DATE/TIME:  5/21/2018 5:24 pm



REASON FOR STUDY:  HA,HTN



COMPARISON:  None.



TECHNIQUE:  Axial images acquired through the brain without intravenous contrast.  Images reviewed wi
th bone, brain and subdural windows.  Additional sagittal and coronal reconstructions were generated.
 Images stored on PACS.

All CT scanners at this facility use dose modulation, iterative reconstruction, and/or weight based d
osing when appropriate to reduce radiation dose to as low as reasonably achievable (ALARA).

[facility]: Dose Right  [facility]: CareDose    [facility]: Dose Right    [facility]: Teradose 4D    [facility]: Smart Publimind



RADIATION DOSE:  CT Rad equipment meets quality standard of care and radiation dose reduction techniq
ues were employed. CTDIvol: 53.2 mGy. DLP: 991 mGy-cm. mGy.



LIMITATIONS:  None.



FINDINGS:  VENTRICLES: Normal size and contour.

CEREBRUM: No masses.  No hemorrhage.  No midline shift.  No evidence for acute infarction. Normal gra
y/white matter differentiation. No areas of low density in the white matter.

CEREBELLUM: No masses.  No hemorrhage.  No alteration of density.  No evidence for acute infarction.

EXTRAAXIAL SPACES: No fluid collections.  No masses.

ORBITS AND GLOBE: No intra- or extraconal masses.  Normal contour of globe without masses.

CALVARIUM: No fracture.

PARANASAL SINUSES: No fluid or mucosal thickening.

SOFT TISSUES: No mass or hematoma.

OTHER: No other significant finding.



IMPRESSION:  NORMAL BRAIN CT WITHOUT CONTRAST.

EVIDENCE OF ACUTE STROKE: NO.



COMMENT:  Quality ID # 436: Final reports with documentation of one or more dose reduction techniques
 (e.g., Automated exposure control, adjustment of the mA and/or kV according to patient size, use of 
iterative reconstruction technique)



TECHNICAL DOCUMENTATION:  JOB ID:  [id number]

 2011 [employer]- All Rights Reserved



Reading location - IP/workstation name: [email or web address]

## 2018-05-22 NOTE — EKG REPORT
SEVERITY:- ABNORMAL ECG -

SINUS RHYTHM

PROBABLE LEFT ATRIAL ABNORMALITY

LEFT VENTRICULAR HYPERTROPHY

:

Confirmed by: Chris Casper MD 22-May-2018 07:36:41

## 2018-06-18 NOTE — ER DOCUMENT REPORT
ED Medical Screen (RME)





- General


TRAVEL OUTSIDE OF THE [place] IN LAST 30 DAYS: No





<BRITTANIE NAGY - Last Filed: 06/18/18 18:08>





<NANDA MADDEN - Last Filed: 06/18/18 19:36>





- General


Chief Complaint: Passed Out Prior to Arrival


Stated Complaint: WEAKNESS


Time Seen by Provider: 06/18/18 16:05


Notes: 


Patient is a 48-year-old female who presents to the emergency department today 

with complaints of a syncopal episode that occurred prior to arrival.  Patient 

states she had another syncopal episode earlier this week and she was seen at 

[facility] for this in [place].  Patient states her symptoms began with 

diaphoresis and progressed to ear ringing, then eye floaters, then full-body 

tingling, and then a 5 minute syncopal episode.  Patient does mention that she 

has had increased stress.  Patient denies any recent medicationc changes or 

supplements.





I have greeted and performed a rapid initial assessment of this patient.  A 

comprehensive ED assessment and evaluation of the patient, analysis of test 

results, and completion of the medical decision making process will be 

conducted by additional ED providers.





Review of systems: 


Positive for diaphoresis, ears ringing, floaters, syncope, and increased 

stress.  Negative for changes in medication or supplement usage.





PHYSICAL EXAM


 


GENERAL: Alert, interacts well. No acute distress.


HEAD: Normocephalic, atraumatic.


EYES: Pupils equal, round, and reactive to light. Extraocular movements intact.


ENT: Oral mucosa moist, tongue midline. 


NECK: Full range of motion. Supple. Trachea midline.


LUNGS: Clear to auscultation bilaterally, no wheezes, rales, or rhonchi. No 

respiratory distress.


HEART: Regular rate and rhythm. No murmurs, gallops, or rubs.


ABDOMEN: Soft, non-tender. Non-distended. Bowel sounds present in all 4 

quadrants. No guarding, rigidity, or rebound.


EXTREMITIES: Moves all 4 extremities spontaneously. 


NEUROLOGICAL: Alert and oriented x3. Normal speech. 


PSYCH: Normal affect, normal mood.


SKIN: Warm, dry, normal turgor. No rashes or lesions noted. (BRITTANIE NAGY)





- Related Data


Allergies/Adverse Reactions: 


 





No Known Allergies Allergy (Verified 06/18/18 15:00)


 











Past Medical History





- Social History


Chew tobacco use (# tins/day): No


Frequency of alcohol use: None


Drug Abuse: None





- Past Medical History


Cardiac Medical History: Reports: Hx Hypertension - Off lisinopril for months.  

Says she can't afford it despite it being $4


Pulmonary Medical History: Reports: Hx COPD


Renal/ Medical History: Reports: Hx Kidney Stones.  Denies: Hx Peritoneal 

Dialysis


Musculoskeltal Medical History: Reports Hx Musculoskeletal Trauma


Psychiatric Medical History: Reports: Hx Anxiety, Hx Depression


Traumatic Medical History: Reports: Hx Fractures - Wrist


Past Surgical History: Reports: Hx Appendectomy, Hx Tubal Ligation





- Immunizations


Hx Diphtheria, Pertussis, Tetanus Vaccination: No


History of Influenza Vaccine for 10/2017 - 3/2018 Season: No





<BRITTANIE NAGY - Last Filed: 06/18/18 18:08>





- Vital signs


Vitals: 





 











Temp Pulse Resp BP Pulse Ox


 


 98.7 F   88   14   174/93 H  97 


 


 06/18/18 15:02  06/18/18 15:02  06/18/18 15:02  06/18/18 15:02  06/18/18 15:02














Course





- Laboratory


Result Diagrams: 


 06/18/18 16:39





 06/18/18 16:39





<BRITTANIE NAGY - Last Filed: 06/18/18 18:08>





- Laboratory


Result Diagrams: 


 06/18/18 16:39





 06/18/18 16:39





<NANDA MADDEN - Last Filed: 06/18/18 19:36>





- Vital Signs


Vital signs: 





 











Temp Pulse Resp BP Pulse Ox


 


 98.7 F   88   16   174/93 H  99 


 


 06/18/18 15:02  06/18/18 15:02  06/18/18 19:00  06/18/18 15:02  06/18/18 19:00














- Laboratory


Laboratory results interpreted by me: 





 











  06/18/18





  16:39


 


MCV  79 L


 


MCH  26.1 L


 


RDW  17.2 H














Doctor's Discharge





<BRITTANIE NAGY - Last Filed: 06/18/18 18:08>





<NANDA MADDEN - Last Filed: 06/18/18 19:36>





- Discharge


Clinical Impression: 


 Syncope and collapse





Condition: Good


Disposition: HOME, SELF-CARE


Additional Instructions: 


You were seen today after an episode of passing out.  Your EKG here is normal.  

At this time, we do not feel that your episode of passing out was from any life-

threatening cause. Please  drink plenty of fluids over the next several days.  

Return to emergency department if you have any further episodes of syncope, 

headache, weakness, numbness, chest pain, or shortness of breath.  Please 

follow up closely with your primary care physician as well as cardiology.


Forms:  Return to Work


Referrals: 


THOMAS TURPIN MD [ACTIVE STAFF] - Follow up in 3-5 days


APRIL RIGGINS MD [Primary Care Provider] - Follow up as needed





Scribe Documentation





- Scribe


Written by Scribe:: Cesia Mclean, 6/18/2018 1821


acting as scribe for :: Misael





<BRITTANIE NAGY - Last Filed: 06/18/18 18:08>

## 2018-06-18 NOTE — ER DOCUMENT REPORT
ED General





- General


Chief Complaint: Passed Out Prior to Arrival


Stated Complaint: WEAKNESS


Time Seen by Provider: 06/18/18 16:05


Notes: 





Patient is a 48 year old female with a past medical history of hypertension who 

presents with 2 episodes of syncope in the past 1 week.  She was evaluated for 

her previous episode of syncope at [facility] and 

subsequently discharged.  The patient states that today she had an episode in 

which she began to feel warm, flushed, diaphoretic and nauseated.  She states 

that this lasted for approximately 1-2 minutes and then she did have an episode 

of syncope.  She states that this is identical to her previous episode earlier 

this week.  At the time of my assessment she denies any ongoing symptoms.  She 

has not noted that anything seems to trigger her symptoms and that they do 

resolve spontaneously.  She denies any known history of DVT, pulmonary embolus, 

congestive heart failure, or chronic kidney disease.  She does note that she 

has been under significant stress lately related to an argument that she had 

with her son and that they have not been speaking for the past 1 week.  She 

feels that this may be contributing to her symptoms.


TRAVEL OUTSIDE OF THE [place] IN LAST 30 DAYS: No





- Related Data


Allergies/Adverse Reactions: 


 





No Known Allergies Allergy (Verified 06/18/18 15:00)


 











Past Medical History





- General


Information source: Patient





- Social History


Smoking Status: Current Every Day Smoker


Chew tobacco use (# tins/day): No


Frequency of alcohol use: None


Drug Abuse: None


Lives with: Alone


Family History: CAD, CVA, DM, Hyperlipidemia, Hypertension, Malignancy


Patient has suicidal ideation: No


Patient has homicidal ideation: No





- Past Medical History


Cardiac Medical History: Reports: Hx Hypertension - Off lisinopril for months.  

Says she can't afford it despite it being $4


Pulmonary Medical History: Reports: Hx COPD


Renal/ Medical History: Reports: Hx Kidney Stones.  Denies: Hx Peritoneal 

Dialysis


Musculoskeltal Medical History: Reports Hx Musculoskeletal Trauma


Psychiatric Medical History: Reports: Hx Anxiety, Hx Depression


Traumatic Medical History: Reports: Hx Fractures - Wrist


Past Surgical History: Reports: Hx Appendectomy, Hx Tubal Ligation





- Immunizations


Hx Diphtheria, Pertussis, Tetanus Vaccination: No





Review of Systems





- Review of Systems


Notes: 





Constitutional: Negative for fever.


HENT: Negative for sore throat.


Eyes: Negative for visual changes.


Cardiovascular: Negative for chest pain.  Positive for syncope


Respiratory: Negative for shortness of breath.


Gastrointestinal: Negative for abdominal pain, vomiting or diarrhea.


Genitourinary: Negative for dysuria.


Musculoskeletal: Negative for back pain.


Skin: Negative for rash.


Neurological: Negative for headaches, weakness or numbness.





10 point ROS negative except as marked above and in HPI.





Physical Exam





- Vital signs


Vitals: 


 











Temp Pulse Resp BP Pulse Ox


 


 98.7 F   88   14   174/93 H  97 


 


 06/18/18 15:02  06/18/18 15:02  06/18/18 15:02  06/18/18 15:02  06/18/18 15:02











Interpretation: Hypertensive


Notes: 





PHYSICAL EXAMINATION:





GENERAL: Well-appearing, well-nourished and in no acute distress.





HEAD: Atraumatic, normocephalic.





EYES: Pupils equal round and reactive to light, extraocular movements intact, 

sclera anicteric, conjunctiva are normal.





ENT: nares patent, oropharynx clear without exudates.  Moist mucous membranes.





NECK: Normal range of motion, supple without lymphadenopathy





LUNGS: Breath sounds clear to auscultation bilaterally and equal.  No wheezes 

rales or rhonchi.





HEART: Regular rate and rhythm without murmurs





ABDOMEN: Soft, nontender, normoactive bowel sounds.  No guarding, no rebound.  

No masses appreciated.





EXTREMITIES: Normal range of motion, no pitting or edema.  No cyanosis.





NEUROLOGICAL: No focal neurological deficits. Moves all extremities 

spontaneously and on command.





PSYCH: Normal mood, normal affect.





SKIN: Warm, Dry, normal turgor, no rashes or lesions noted.





Course





- Re-evaluation


Re-evalutation: 





06/18/18 18:30


Presentation of syncope of unclear etiology. Patient normotensive, alert, 

without focal neurologic deficits at time of arrival. Denies syncope was during 

exertion. No preceding symptoms of palpitations, chest pain, or shortness of 

breath. Patient asymptomatic at time of arrival. EKG is without evidence of HCOM

, right heart strain, ST changes to suggest ischemia, prolong QTc, delta wave, 

epsilon wave, or Brugada syndrome. Patient denies any family history of sudden 

cardiac death, personal history of of structural heart disease. Patient denies 

any symptoms to suggest an acute PE, MI, TAD, SAH, seizure, or acute GI bleed 

as the etiology of their syncope today. On exam, no murmurs to suggest critical 

aortic stenosis as possible etiology.  Based on overall clinical history, exam 

findings, vitals, and patients appearance, I feel it is safe for patient to be 

discharged home at this time with close outpatient follow-up and strict return 

precautions. Patient is in agreement with this plan, has verbalized indications 

for return to ED, and questions have been answered.





- Vital Signs


Vital signs: 


 











Temp Pulse Resp BP Pulse Ox


 


 98.7 F   88   16   174/93 H  99 


 


 06/18/18 15:02  06/18/18 15:02  06/18/18 19:00  06/18/18 15:02  06/18/18 19:00














- Laboratory


Result Diagrams: 


 06/18/18 16:39





 06/18/18 16:39


Laboratory results interpreted by me: 


 











  06/18/18





  16:39


 


MCV  79 L


 


MCH  26.1 L


 


RDW  17.2 H














- EKG Interpretation by Me


Additional EKG results interpreted by me: 





06/18/18 18:30


Normal sinus rhythm.  Rate 72.  No ST elevations or depressions.  QTC is 456.





Discharge





- Discharge


Clinical Impression: 


 Syncope and collapse





Condition: Good


Disposition: HOME, SELF-CARE


Additional Instructions: 


You were seen today after an episode of passing out.  Your EKG here is normal.  

At this time, we do not feel that your episode of passing out was from any life-

threatening cause. Please  drink plenty of fluids over the next several days.  

Return to emergency department if you have any further episodes of syncope, 

headache, weakness, numbness, chest pain, or shortness of breath.  Please 

follow up closely with your primary care physician as well as cardiology.


Forms:  Return to Work


Referrals: 


APRIL RIGGINS MD [Primary Care Provider] - Follow up as needed


THOMAS TURPIN MD [ACTIVE STAFF] - Follow up in 3-5 days

## 2018-06-18 NOTE — EKG REPORT
SEVERITY:- ABNORMAL ECG -

SINUS RHYTHM

CONSIDER LEFT VENTRICULAR HYPERTROPHY

:

Confirmed by: Ayaz Chaudhary 18-Jun-2018 18:48:58

## 2018-06-18 NOTE — RADIOLOGY REPORT (SQ)
EXAM DESCRIPTION:  CHEST SINGLE VIEW



COMPLETED DATE/TIME:  6/18/2018 4:50 pm



REASON FOR STUDY:  syncope



COMPARISON:  5/21/2018.



EXAM PARAMETERS:  NUMBER OF VIEWS: One view.

TECHNIQUE: Single frontal radiographic view of the chest acquired.

RADIATION DOSE: NA

LIMITATIONS: None.



FINDINGS:  LUNGS AND PLEURA: No acute infiltrates or effusions.

MEDIASTINUM AND HILAR STRUCTURES: No masses.  Contour normal.

HEART AND VASCULAR STRUCTURES: The heart is normal.  The pulmonary vasculature is normal.

BONES: No acute findings.

HARDWARE: None in the chest.

OTHER: No other significant finding.



IMPRESSION:  NO ACUTE RADIOGRAPHIC FINDING IN THE CHEST.



TECHNICAL DOCUMENTATION:  JOB ID:  [id number]

[id number]

 2011 [employer]- All Rights Reserved



Reading location - IP/workstation name: [email or web address]

## 2018-06-25 NOTE — ER DOCUMENT REPORT
ED General





- General


Chief Complaint: Back Pain


Stated Complaint: SYNCOPE


Time Seen by Provider: 06/25/18 16:24


Notes: 





Patient is a 48-year old female with a past medical history of hypertension who 

presents after having a syncopal episode today without any preceding symptoms 

by her report.  The patient has had 4-5 syncopal episodes over the past 2 weeks 

and I did see her approximately 1 week ago for the same.  At that time the 

patient had appropriate symptoms prior to her episode of syncope but today 

states that she was getting dressed and abruptly lost consciousness.  She 

states that her significant other heard a "thud" and found her unconscious 

lying on the floor.  The patient has no recollection of becoming lightheaded or 

having any symptoms prior to this event.  At the time of my evaluation she 

states that she feels well.  She denies any chest pain, shortness of breath, 

headache, neck pain and denies sustaining any injury during today's episode of 

syncope.  She has not been able to follow-up with cardiology due to insurance 

issues.  She cannot think of anything that would have triggered today's episode 

of syncope.  Her symptoms did resolve spontaneously.


TRAVEL OUTSIDE OF THE [place] IN LAST 30 DAYS: No





- Related Data


Allergies/Adverse Reactions: 


 





No Known Allergies Allergy (Verified 06/25/18 16:13)


 








Home Medications: Patient states she is supposed to be taking Lisinopril but 

cannot afford it.





Past Medical History





- General


Information source: Patient





- Social History


Smoking Status: Current Every Day Smoker


Chew tobacco use (# tins/day): No


Frequency of alcohol use: None


Drug Abuse: None


Lives with: Spouse/Significant other


Family History: CAD, CVA, DM, Hyperlipidemia, Hypertension, Malignancy


Patient has suicidal ideation: No


Patient has homicidal ideation: No





- Past Medical History


Cardiac Medical History: Reports: Hx Hypertension - Off lisinopril for months.  

Says she can't afford it despite it being $4


Pulmonary Medical History: Reports: Hx COPD


Renal/ Medical History: Reports: Hx Kidney Stones.  Denies: Hx Peritoneal 

Dialysis


Musculoskeltal Medical History: Reports Hx Musculoskeletal Trauma


Psychiatric Medical History: Reports: Hx Anxiety, Hx Depression


Traumatic Medical History: Reports: Hx Fractures - Wrist


Past Surgical History: Reports: Hx Appendectomy, Hx Tubal Ligation





- Immunizations


Hx Diphtheria, Pertussis, Tetanus Vaccination: No





Review of Systems





- Review of Systems


Notes: 





Constitutional: Negative for fever.


HENT: Negative for sore throat.


Eyes: Negative for visual changes.


Cardiovascular: Negative for chest pain.  Positive for syncope


Respiratory: Negative for shortness of breath.


Gastrointestinal: Negative for abdominal pain, vomiting or diarrhea.


Genitourinary: Negative for dysuria.


Musculoskeletal: Negative for back pain.


Skin: Negative for rash.


Neurological: Negative for headaches, weakness or numbness.





10 point ROS negative except as marked above and in HPI.





Physical Exam





- Vital signs


Vitals: 


 











Temp Pulse Resp BP Pulse Ox


 


 98.6 F   78   16   186/91 H  98 


 


 06/25/18 15:41  06/25/18 15:41  06/25/18 15:41  06/25/18 15:41  06/25/18 15:41











Interpretation: Hypertensive


Notes: 





PHYSICAL EXAMINATION:





GENERAL: Well-appearing, well-nourished and in no acute distress.





HEAD: Atraumatic, normocephalic.





EYES: Pupils equal round and reactive to light, extraocular movements intact, 

sclera anicteric, conjunctiva are normal.





ENT: nares patent, oropharynx clear without exudates.  Moist mucous membranes.





NECK: Normal range of motion, supple without lymphadenopathy





LUNGS: Breath sounds clear to auscultation bilaterally and equal.  No wheezes 

rales or rhonchi.





HEART: Regular rate and rhythm without murmurs





ABDOMEN: Soft, nontender, normoactive bowel sounds.  No guarding, no rebound.  

No masses appreciated.





EXTREMITIES: Normal range of motion, no pitting or edema.  No cyanosis.





NEUROLOGICAL: No focal neurological deficits. Moves all extremities 

spontaneously and on command.





PSYCH: Normal mood, normal affect.





SKIN: Warm, Dry, normal turgor, no rashes or lesions noted.





Course





- Re-evaluation


Re-evalutation: 





06/25/18 18:52


Patient presents with a syncopal episode that occurred without any preceding 

symptoms worrisome for possible dysrhythmia.  Patient has had 4 total episodes 

of syncope within the past 2 weeks but has been unable to follow-up with 

cardiology due to insurance issues.  She has never had an episode of syncope 

without preceding symptoms.  She denies any symptoms at the time of my 

evaluation other than a mild low right back pain which has been present for the 

past several days.  No red flag symptoms for this back pain.  No abdominal 

pain.  EKG without ischemic changes, prolonged QT a widened QRS.  Labs again 

unremarkable today.  Given that the patient had a syncopal episode without 

preceding symptoms of believe she requires hospitalization for continuous 

cardiac event monitoring as well as echocardiogram and cardiology consultation.





- Vital Signs


Vital signs: 


 











Temp Pulse Resp BP Pulse Ox


 


 98.2 F   92   16   185/96 H  100 


 


 06/25/18 23:38  06/26/18 02:00  06/25/18 23:38  06/25/18 23:38  06/25/18 23:38














- Laboratory


Result Diagrams: 


 06/25/18 17:03





 06/25/18 17:03


Laboratory results interpreted by me: 


 











  06/25/18





  17:03


 


Hgb  11.4 L


 


Hct  34.6 L


 


MCV  78 L


 


MCH  25.8 L


 


RDW  16.8 H














- EKG Interpretation by Me


Additional EKG results interpreted by me: 





06/25/18 18:53


Sinus rhythm.  Rate 77.  No ST elevations or depressions.  QTC is 449.





Discharge





- Discharge


Clinical Impression: 


 Syncope and collapse, Essential hypertension





Right low back pain


Qualifiers:


 Chronicity: acute Sciatica presence: without sciatica Qualified Code(s): M54.5 

- Low back pain





Condition: Fair


Disposition: ADMITTED AS OBSERVATION


Admitting Provider: Hospitalist


Unit Admitted: Telemetry

## 2018-06-25 NOTE — ER DOCUMENT REPORT
ED Medical Screen (RME)





- General


Chief Complaint: Back Pain


Stated Complaint: SYNCOPE


Time Seen by Provider: 06/25/18 16:24


Notes: 





48-year-old female patient comes emergency room complaining of syncopal episode 

without warning signs.  She denies injury in the event today where she woke up 

on the floor when she was folding clothes.  She also has been having low back 

pain.  She was seen here on 6/18/2018 for similar syncopal episode, was to 

follow-up with cardiology but states she does not have insurance.


She had been seen at [facility] for the same thing and 

worked up in discharge.


By history she has never had a monitor or event monitor.





I have greeted and performed a rapid initial assessment of this patient.  A 

comprehensive ED assessment and evaluation of the patient, analysis of test 

results and completion of the medical decision making process will be conducted 

by additional ED providers.


TRAVEL OUTSIDE OF THE [place] IN LAST 30 DAYS: No





- Related Data


Allergies/Adverse Reactions: 


 





No Known Allergies Allergy (Verified 06/25/18 16:13)


 








Home Medications: Patient states she is supposed to be taking Lisinopril but 

cannot afford it.





Past Medical History





- Social History


Chew tobacco use (# tins/day): No


Frequency of alcohol use: None


Drug Abuse: None





- Past Medical History


Cardiac Medical History: Reports: Hx Hypertension - Off lisinopril for months.  

Says she can't afford it despite it being $4


Pulmonary Medical History: Reports: Hx COPD


Renal/ Medical History: Reports: Hx Kidney Stones.  Denies: Hx Peritoneal 

Dialysis


Musculoskeltal Medical History: Reports Hx Musculoskeletal Trauma


Psychiatric Medical History: Reports: Hx Anxiety, Hx Depression


Traumatic Medical History: Reports: Hx Fractures - Wrist


Past Surgical History: Reports: Hx Appendectomy, Hx Tubal Ligation





- Immunizations


Hx Diphtheria, Pertussis, Tetanus Vaccination: No


History of Influenza Vaccine for 10/2017 - 3/2018 Season: No





Physical Exam





- Vital signs


Vitals: 





 











Temp Pulse Resp BP Pulse Ox


 


 98.6 F   78   16   186/91 H  98 


 


 06/25/18 15:41  06/25/18 15:41  06/25/18 15:41  06/25/18 15:41  06/25/18 15:41














Course





- Vital Signs


Vital signs: 





 











Temp Pulse Resp BP Pulse Ox


 


 98.6 F   78   16   186/91 H  98 


 


 06/25/18 15:41  06/25/18 15:41  06/25/18 15:41  06/25/18 15:41  06/25/18 15:41














Doctor's Discharge





- Discharge


Referrals: 


APRIL RIGGINS MD [Primary Care Provider] - Follow up as needed

## 2018-06-26 NOTE — EKG REPORT
SEVERITY:- ABNORMAL ECG -

SINUS RHYTHM

CONSIDER LEFT VENTRICULAR HYPERTROPHY

:

Confirmed by: Rocio Mosquera MD 26-Jun-2018 07:55:43

## 2018-06-26 NOTE — PDOC PROGRESS REPORT
Subjective


Progress Note for:: 06/26/18


Subjective:: 





Patient admitted with hypertensive urgency as well as syncope.  She states that 

she was unable to afford her lisinopril and so she has been out of medicine for 

about 4 weeks.  She is unaware that she can purchase lisinopril from [employer] $4 

for a 30 day supply.


She is still complains of occasional headache but denies any nausea and 

vomiting blurred vision, 











Reason For Visit: 


SYNCOPE








Physical Exam


Vital Signs: 


 











Temp Pulse Resp BP Pulse Ox


 


 98 F   82   14   150/86 H  100 


 


 06/26/18 12:00  06/26/18 12:00  06/26/18 12:00  06/26/18 12:00  06/26/18 12:00








 Intake & Output











 06/25/18 06/26/18 06/27/18





 06:59 06:59 06:59


 


Intake Total  1320 


 


Output Total  1000 


 


Balance  320 


 


Weight  53 kg 











General appearance: PRESENT: no acute distress, well-developed, well-nourished


Head exam: PRESENT: atraumatic, normocephalic


Eye exam: PRESENT: conjunctiva pink, EOMI, PERRLA.  ABSENT: scleral icterus


Ear exam: PRESENT: normal external ear exam


Mouth exam: PRESENT: moist, tongue midline


Neck exam: ABSENT: carotid bruit, JVD, lymphadenopathy, thyromegaly


Respiratory exam: PRESENT: clear to auscultation prosper.  ABSENT: rales, rhonchi, 

wheezes


Cardiovascular exam: PRESENT: RRR.  ABSENT: diastolic murmur, rubs, systolic 

murmur


Pulses: PRESENT: normal dorsalis pedis pul


Vascular exam: PRESENT: normal capillary refill


GI/Abdominal exam: PRESENT: normal bowel sounds, soft.  ABSENT: distended, 

guarding, mass, organolmegaly, rebound, tenderness


Rectal exam: PRESENT: deferred


Extremities exam: PRESENT: full ROM.  ABSENT: calf tenderness, clubbing, pedal 

edema


Neurological exam: PRESENT: alert, awake, oriented to person, oriented to place

, oriented to time, oriented to situation, CN II-XII grossly intact.  ABSENT: 

motor sensory deficit


Psychiatric exam: PRESENT: appropriate affect, normal mood.  ABSENT: homicidal 

ideation, suicidal ideation


Skin exam: PRESENT: dry, intact, warm.  ABSENT: cyanosis, rash





Results


Laboratory Results: 


 











  06/26/18





  04:49


 


Triglycerides  94


 


Cholesterol  152.00


 


LDL Cholesterol Direct  66


 


VLDL Cholesterol  19.0


 


HDL Cholesterol  68








 











  06/25/18 06/26/18 06/26/18





  22:55 04:49 04:49


 


Creatine Kinase    29 L


 


CK-MB (CK-2)  0.39  0.34 


 


Troponin I  < 0.012  < 0.012 














  06/26/18





  10:47


 


Creatine Kinase 


 


CK-MB (CK-2)  0.34


 


Troponin I  < 0.012














Assessment & Plan





- Time


Time Spent with patient: 15-24 minutes


Medications reviewed and adjusted accordingly: Yes


Anticipated discharge: Home


Within: within 24 hours





- Inpatient Certification


Based on my medical assessment, after consideration of the patient's 

comorbidities, presenting symptoms, or acuity I expect that the services needed 

warrant INPATIENT care.: Yes


Medical Necessity: Need Close Monitoring Due to Risk of Patient Decompensation





- Plan Summary


Plan Summary: 








Syncope and collapse-unclear what to really make of this but patient has been 

on telemetry with no evidence of any arrhythmia.  I will go ahead and order an 

echocardiogram and show no underlying cardiac etiology.


2.  Hypertensive urgency likely contributing to patient's symptoms.  Patient 

has been started on lisinopril and will continue to use hydralazine as needed.


3.  Depression with anxiety she has been started on trazodone


4.  Right low back pain which is chronic

## 2018-06-26 NOTE — PDOC H&P
History of Present Illness


Admission Date/PCP: 


  18 19:41





  APRIL RIGGINS MD





Patient complains of: Syncope 3


History of Present Illness: 


KIRSTIE FERNANDEZ is a 48 year old female with a past medical history of migraine 

headache, chronic right flank pain, depression, anxiety, hypertension, tobacco 

dependence who presents with syncope 5 over the last 2 weeks.  Patient states 

this occurs while at rest without warning, denying headaches, palpitations 

shortness of breath nausea vomiting or chest pain.  Notably she has not 

sustained injury during any event, she admits exceptional stress involving 

arguments with her son..  Patient denies current medication use in the 

emergency room she is found to have uncontrolled hypertension but an otherwise 

unremarkable workup and referred the hospitalist for observation.








Past Medical History


Cardiac Medical History: Reports: Hypertension - Off lisinopril for months.  

Says she can't afford it despite it being $4


Pulmonary Medical History: Reports: Chronic Obstructive Pulmonary Disease (COPD)


EENT Medical History: Reports: None


Neurological Medical History: Reports: Migraine


Psychiatric Medical History: Reports: Depression





Past Surgical History


Past Surgical History: Reports: Appendectomy, Tubal Ligation





Social History


Information Source: Patient


Lives with: Spouse/Significant other


Smoking Status: Current Every Day Smoker


Number of Years Smokin


Frequency of Alcohol Use: None


Hx Recreational Drug Use: No - denies


Drugs: None


Hx Prescription Drug Abuse: No





- Advance Directive


Resuscitation Status: Full Code





Family History


Family History: CAD, CVA, DM, Hyperlipidemia, Hypertension, Malignancy


Parental Family History Reviewed: Yes


Children Family History Reviewed: Yes


Sibling(s) Family History Reviewed.: Yes





Medication/Allergy


Home Medications: 








Lisinopril [Lisinopril] 10 mg PO DAILY 18 








Allergies/Adverse Reactions: 


 





No Known Allergies Allergy (Verified 18 16:13)


 











Review of Systems


Constitutional: ABSENT: chills, fever(s), headache(s), weight gain, weight loss


Eyes: ABSENT: visual disturbances


Ears: ABSENT: hearing changes


Cardiovascular: ABSENT: chest pain, dyspnea on exertion, edema, orthropnea, 

palpitations


Respiratory: ABSENT: cough, hemoptysis


Gastrointestinal: ABSENT: abdominal pain, constipation, diarrhea, hematemesis, 

hematochezia, nausea, vomiting


Genitourinary: ABSENT: dysuria, hematuria


Musculoskeletal: ABSENT: joint swelling


Integumentary: ABSENT: rash, wounds


Neurological: ABSENT: abnormal gait, abnormal speech, confusion, dizziness, 

focal weakness, syncope


Psychiatric: ABSENT: anxiety, depression, homidical ideation, suicidal ideation


Endocrine: ABSENT: cold intolerance, heat intolerance, polydipsia, polyuria


Hematologic/Lymphatic: ABSENT: easy bleeding, easy bruising





Physical Exam


Vital Signs: 


 











Temp Pulse Resp BP Pulse Ox


 


 98.2 F   92   16   185/96 H  100 


 


 18 23:38  18 02:00  18 23:38  18 23:38  18 23:38








 Intake & Output











 18





 11:59 11:59 11:59


 


Weight   53 kg











General appearance: PRESENT: no acute distress, well-developed, well-nourished


Head exam: PRESENT: atraumatic, normocephalic


Eye exam: PRESENT: conjunctiva pink, EOMI, PERRLA.  ABSENT: scleral icterus


Ear exam: PRESENT: normal external ear exam


Mouth exam: PRESENT: moist, tongue midline


Neck exam: ABSENT: carotid bruit, JVD, lymphadenopathy, thyromegaly


Respiratory exam: PRESENT: clear to auscultation prosper.  ABSENT: rales, rhonchi, 

wheezes


Cardiovascular exam: PRESENT: RRR.  ABSENT: diastolic murmur, rubs, systolic 

murmur


Pulses: PRESENT: normal dorsalis pedis pul


Vascular exam: PRESENT: normal capillary refill


GI/Abdominal exam: PRESENT: normal bowel sounds, soft.  ABSENT: distended, 

guarding, mass, organolmegaly, rebound, tenderness


Rectal exam: PRESENT: deferred


Extremities exam: PRESENT: full ROM.  ABSENT: calf tenderness, clubbing, pedal 

edema


Neurological exam: PRESENT: alert, awake, oriented to person, oriented to place

, oriented to time, oriented to situation, CN II-XII grossly intact.  ABSENT: 

motor sensory deficit


Psychiatric exam: PRESENT: appropriate affect, normal mood.  ABSENT: homicidal 

ideation, suicidal ideation


Skin exam: PRESENT: dry, intact, warm.  ABSENT: cyanosis, rash





Results


Laboratory Results: 


 











  18





  22:55


 


CK-MB (CK-2)  0.39


 


Troponin I  < 0.012














Assessment & Plan





- Diagnosis


(1) Syncope and collapse


Is this a current diagnosis for this admission?: Yes   


Plan: 


Telemetry observation, serial cardiac enzymes, orthostatic blood pressures 

consider echocardiogram.  Reassurance








(2) Depression with anxiety


Is this a current diagnosis for this admission?: Yes   


Plan: 


Trial of trazodone








(3) Essential hypertension


Is this a current diagnosis for this admission?: Yes   


Plan: 


Trial trazodone and ACE inhibitor








(4) Right low back pain


Qualifiers: 


   Chronicity: acute   Sciatica presence: without sciatica   Qualified Code(s): 

M54.5 - Low back pain   


Is this a current diagnosis for this admission?: Yes   


Plan: 


Chronic present on ER evaluation .  Symptomatic management








- Time


Time Spent: 50 to 70 Minutes





- Inpatient Certification


Medical Necessity: Need Close Monitoring Due to Risk of Patient Decompensation

## 2018-06-27 NOTE — PDOC DISCHARGE SUMMARY
General





- Admit/Disc Date/PCP


Admission Date/Primary Care Provider: 


  06/25/18 19:41





  APRIL RIGGINS MD





Discharge Date: 06/27/18





- Discharge Diagnosis


(1) Right low back pain


Is this a current diagnosis for this admission?: Yes   





(2) Depression with anxiety


Is this a current diagnosis for this admission?: Yes   


Summary: 


Patient started on Desyrel and this will need outpatient management and follow-

up








(3) Hypertensive urgency


Is this a current diagnosis for this admission?: Yes   





(4) Syncope and collapse


Is this a current diagnosis for this admission?: Yes   





- Additional Information


Resuscitation Status: Full Code


Discharge Diet: Cardiac


Discharge Activity: Activity As Tolerated


Prescriptions: 


Lisinopril [Prinivil 10 mg Tablet] 20 mg PO DAILY 30 Days  tablet


Trazodone HCl [Desyrel 50 mg Tablet] 50 mg PO Q12 #60 tablet


Home Medications: 








Lisinopril [Prinivil 10 mg Tablet] 20 mg PO DAILY 30 Days  tablet 06/27/18 


Trazodone HCl [Desyrel 50 mg Tablet] 50 mg PO Q12 #60 tablet 06/27/18 











History of Present Illness


Patient complains of: Patient was admitted with uncontrolled hypertension 

secondary to noncompliance.  Patient was unable to afford her medications.  She 

also had been under some stress and there was a complaint of syncope 3


History of Present Illness: 


KIRSTIE FERNANDEZ is a 48 year old female


 with a past medical history of migraine headache, chronic right flank pain, 

depression, anxiety, hypertension, tobacco dependence who presents with syncope 

5 over the last 2 weeks.  Patient states this occurs while at rest without 

warning, denying headaches, palpitations shortness of breath nausea vomiting or 

chest pain.  Notably she has not sustained injury during any event, she admits 

exceptional stress involving arguments with her son..  Patient denies current 

medication use in the emergency room she is found to have uncontrolled 

hypertension but an otherwise unremarkable workup and referred the hospitalist 

for observation.








Hospital Course


Hospital Course: 





Patient was admitted with uncontrolled hypertension secondary to noncompliance.

  Patient was unable to afford her medications.  She also had been under some 

stress and there was a complaint of syncope 3.  Patient was also thought to be 

depressed due to some stressors.  She was treated with parenteral 

antihypertensives and then restarted back on her previous home medicine of 

lisinopril at a higher dose.  Her initial blood pressure was 185/96 and this 

has continued to improve while in hospital with the last one at 146/84.  She 

has ambulated with no evidence of syncope.  Her two-dimensional echocardiogram 

is officially to be red but discussion with cardiology indicates no significant 

findings.  With stable hemodynamics and resolution of presenting symptoms she 

is been discharged home.


Patient has been advised that she can obtain a lisinopril for $4 for a 30 day 

supply at [facility] and she has been encouraged to be compliant with her 

medications





Physical Exam


Vital Signs: 


 











Temp Pulse Resp BP Pulse Ox


 


 98.2 F   69   12   146/84 H  100 


 


 06/27/18 11:28  06/27/18 11:28  06/27/18 11:28  06/27/18 11:28  06/27/18 11:28








 Intake & Output











 06/26/18 06/27/18 06/28/18





 06:59 06:59 06:59


 


Intake Total 1320 1162 


 


Output Total 1000 800 


 


Balance 320 362 


 


Weight 53 kg 55.5 kg 











General appearance: PRESENT: no acute distress, well-developed, well-nourished


Head exam: PRESENT: atraumatic, normocephalic


Eye exam: PRESENT: conjunctiva pink, EOMI, PERRLA.  ABSENT: scleral icterus


Ear exam: PRESENT: normal external ear exam


Mouth exam: PRESENT: moist, tongue midline


Neck exam: ABSENT: carotid bruit, JVD, lymphadenopathy, thyromegaly


Respiratory exam: PRESENT: clear to auscultation prosper.  ABSENT: rales, rhonchi, 

wheezes


Cardiovascular exam: PRESENT: RRR.  ABSENT: diastolic murmur, rubs, systolic 

murmur


Pulses: PRESENT: normal dorsalis pedis pul


Vascular exam: PRESENT: normal capillary refill


GI/Abdominal exam: PRESENT: normal bowel sounds, soft.  ABSENT: distended, 

guarding, mass, organolmegaly, rebound, tenderness


Rectal exam: PRESENT: deferred


Extremities exam: PRESENT: full ROM.  ABSENT: calf tenderness, clubbing, pedal 

edema


Neurological exam: PRESENT: alert, awake, oriented to person, oriented to place

, oriented to time, oriented to situation, CN II-XII grossly intact.  ABSENT: 

motor sensory deficit


Psychiatric exam: PRESENT: appropriate affect, normal mood.  ABSENT: homicidal 

ideation, suicidal ideation


Skin exam: PRESENT: dry, intact, warm.  ABSENT: cyanosis, rash





Results


Laboratory Results: 


 











  06/25/18 06/26/18 06/26/18





  22:55 04:49 04:49


 


Creatine Kinase    29 L


 


CK-MB (CK-2)  0.39  0.34 


 


Troponin I  < 0.012  < 0.012 














  06/26/18





  10:47


 


Creatine Kinase 


 


CK-MB (CK-2)  0.34


 


Troponin I  < 0.012














Qualifiers





- *


PATIENT BEING DISCHARGED WITH ANY OF THE FOLLOWING DIAGNOSIS: No





Plan


Time Spent: Less than 30 Minutes

## 2018-06-27 NOTE — XCELERA REPORT
[address] [address]

                             Tel: [phone]

                             Fax: [phone]



                    Transthoracic Echocardiogram Report

____________________________________________________________________________



Name: KIRSTIE FERNANDEZ

MRN: [id number]                Age: 48 yrs

Gender: Female                 : 1970

Patient Status: Inpatient      Patient Location: [address] [place] [address]

Account #: [id number]

Study Date: 2018 10:32 AM

Accession #: [id number]

____________________________________________________________________________



Height: 62 in        Weight: 116 lb        BSA: 1.5 m2



____________________________________________________________________________

Procedure: A two-dimensional transthoracic echocardiogram with color flow

and Doppler was performed. Study Quality: Good.

Reason For Study: SYNCOPE



History: SYNCOPE.

Ordering Physician: SHENG RON

Performed By: Cindy Lo

____________________________________________________________________________





Interpretation Summary

The left ventricle is normal in size.

There is normal left ventricular wall thickness.

LV EF is > than 65%

Left ventricular systolic function is normal.

Doppler measurements suggest normal left ventricular diastolic function

The left ventricular wall motion is normal.

There is no thrombus.

There is no ventricular septal defect visualized.

The right atrium is normal.

The left atrial size is normal.

The interatrial septum is intact with no evidence for an atrial septal

defect.

There is no evidence of mitral valve prolapse.

There is no vegetation seen on the mitral valve.

There is no mitral valve stenosis.

There is a mild amount of mitral regurgitation

There is no aortic valvular vegetation.

There is no aortic valve stenosis

There is no LVOT obstruction.

No aortic regurgitation is present.

There is no tricuspid stenosis.

There is a mild amount of tricuspid regurgitation

Early mild pulmonary hypertension . RVSP is 33 mm of Hg , with RA mean of

10.

There is no pulmonic valvular stenosis.

There is no pulmonic valvular regurgitation.

The aortic root is normal size.

There is no pericardial effusion.



____________________________________________________________________________



MMode/2D Measurements & Calculations

RVDd: 2.4 cm   LVIDd: 4.5 cm  FS: 38.6 %            Ao root diam: 2.3 cm

IVSd: 0.98 cm  LVIDs: 2.7 cm  EDV(Teich): 90.7 ml

               LVPWd: 0.94 cm ESV(Teich): 28.0 ml   Ao root area: 4.2 cm2

                              EF(Teich): 69.1 %



Doppler Measurements & Calculations

MV E max edy:      MV dec slope:       Ao V2 max:        LV V1 max P.2 cm/sec                           147.4 cm/sec      4.9 mmHg

MV A max edy:      620.6 cm/sec2       Ao max PG:        LV V1 max:

95.4 cm/sec        MV dec time:        8.7 mmHg          110.4 cm/sec

MV E/A: 1.2        0.18 sec



        _____________________________________________________________

MR max edy:        PA V2 max:          TR max edy:

549.3 cm/sec       85.6 cm/sec         239.5 cm/sec

MR max PG:         PA max P.9 mmHg TR max P.7 mmHg                             23.0 mmHg



____________________________________________________________________________

Left Ventricle

The left ventricle is normal in size. There is normal left ventricular wall

thickness. LV EF is > than 65%. Left ventricular systolic function is

normal. Doppler measurements suggest normal left ventricular diastolic

function. The left ventricular wall motion is normal. There is no thrombus.

There is no ventricular septal defect visualized.



Right Ventricle

The right ventricle is normal in size and function.



Atria

The right atrium is normal. The left atrial size is normal. The interatrial

septum is intact with no evidence for an atrial septal defect.





Mitral Valve

There is no evidence of mitral valve prolapse. There is no vegetation seen

on the mitral valve. There is no mitral valve stenosis. There is a mild

amount of mitral regurgitation.



Aortic Valve

The aortic valve is trileaflet. The aortic valve opens well. There is no

aortic valvular vegetation. There is no aortic valve stenosis. There is no

LVOT obstruction. No aortic regurgitation is present.



Tricuspid Valve

There is no tricuspid stenosis. There is a mild amount of tricuspid

regurgitation. Early mild pulmonary hypertension . RVSP is 33 mm of Hg ,

with RA mean of 10.



Pulmonic Valve

There is no pulmonic valvular stenosis. There is no pulmonic valvular

regurgitation.



Great Vessels

The aortic root is normal size.





Effusions

There is no pericardial effusion.



____________________________________________________________________________



Electronically signed by:      Rocio Mosquera      on 2018 05:18

PM



CC: SHENG RON

>

Rocio Mosquera

## 2018-06-30 NOTE — ER DOCUMENT REPORT
Doctor's Note


Notes: 


06/30/18 02:08 I went to see this patient in her room.  However, according to 

the nurse, the patient left without being seen because her boyfriend was also a 

patient in the ER and was discharged.  According to the RN, the patient 

ambulated with a steady gait and appeared to have capacity to make her own 

decisions. She was overhead paged 3 times and all bathrooms and rooms were 

checked.  Also, the waiting room and parking lot were also checked for the 

patient without success. She is a LWBS.

## 2019-04-30 NOTE — RADIOLOGY REPORT (SQ)
EXAM DESCRIPTION:  CHEST 2 VIEWS



COMPLETED DATE/TIME:  5/21/2018 5:23 pm



REASON FOR STUDY:  cp



COMPARISON:  None.



EXAM PARAMETERS:  NUMBER OF VIEWS: two views

TECHNIQUE: Digital Frontal and Lateral radiographic views of the chest acquired.

RADIATION DOSE: NA

LIMITATIONS: none



FINDINGS:  LUNGS AND PLEURA: No opacities, masses or pneumothorax. No pleural effusion.

MEDIASTINUM AND HILAR STRUCTURES: No masses or contour abnormalities.

HEART AND VASCULAR STRUCTURES: Heart normal size.  No evidence for failure.

BONES: No acute findings.

HARDWARE: None in the chest.

OTHER: No other significant finding.



IMPRESSION:  NO ACUTE RADIOGRAPHIC FINDING IN THE CHEST.



TECHNICAL DOCUMENTATION:  JOB ID:  [id number]

 2011 [employer]- All Rights Reserved



Reading location - IP/workstation name: [email or web address]